# Patient Record
Sex: FEMALE | Race: WHITE | NOT HISPANIC OR LATINO | Employment: FULL TIME | URBAN - METROPOLITAN AREA
[De-identification: names, ages, dates, MRNs, and addresses within clinical notes are randomized per-mention and may not be internally consistent; named-entity substitution may affect disease eponyms.]

---

## 2019-03-21 ENCOUNTER — HOSPITAL ENCOUNTER (OUTPATIENT)
Dept: LABOR AND DELIVERY | Facility: HOSPITAL | Age: 29
Discharge: HOME | End: 2019-03-21
Attending: OBSTETRICS & GYNECOLOGY

## 2019-03-21 LAB
BASOPHILS # BLD AUTO: 0.05 10*3/UL (ref 0–0.2)
BASOPHILS NFR BLD AUTO: 0.3 % (ref 0–3)
CONV ABS IMM GRAN: 0.15 10*3/UL (ref 0–0.2)
CONV IMMATURE GRAN: 0.9 % (ref 0–1.8)
DEPRECATED RDW RBC AUTO: 45.6 FL (ref 36.4–46.3)
EOSINOPHIL # BLD AUTO: 0.03 10*3/UL (ref 0–0.7)
EOSINOPHIL # BLD AUTO: 0.2 % (ref 0–7)
ERYTHROCYTE [DISTWIDTH] IN BLOOD BY AUTOMATED COUNT: 13.7 % (ref 11.7–14.4)
HBA1C MFR BLD: 11.5 G/DL (ref 12–16)
HCT VFR BLD AUTO: 34.8 % (ref 37–47)
LYMPHOCYTES # BLD AUTO: 2.68 10*3/UL (ref 1–5)
MCH RBC QN AUTO: 30.2 PG (ref 27–31)
MCHC RBC AUTO-ENTMCNC: 33 G/DL (ref 33–37)
MCV RBC AUTO: 91.3 FL (ref 81–99)
MONOCYTES # BLD AUTO: 0.74 10*3/UL (ref 0.2–1.2)
MONOCYTES NFR BLD AUTO: 4.4 % (ref 3–10)
NEUTROPHILS # BLD AUTO: 13.24 10*3/UL (ref 2–8)
NEUTROPHILS NFR BLD AUTO: 78.3 % (ref 30–85)
NRBC CBCN: 0 % (ref 0–0.7)
PLATELET # BLD AUTO: 344 10*3/UL (ref 130–400)
PMV BLD AUTO: 9.7 FL (ref 9.4–12.3)
RBC # BLD AUTO: 3.81 10*6/UL (ref 4.2–5.4)
VARIANT LYMPHS NFR BLD MANUAL: 15.9 % (ref 20–45)
WBC # BLD AUTO: 16.89 10*3/UL (ref 4.8–10.8)

## 2019-03-22 LAB
ABO GROUP BLD: NORMAL
BLD GP AB SCN SERPL QL: NORMAL
CONV ABD CONTROL: NORMAL
HGB F MFR AMN: 0 % (ref 0–0.99)
RH BLD: NORMAL

## 2022-02-08 ENCOUNTER — INITIAL PRENATAL (OUTPATIENT)
Dept: OBSTETRICS AND GYNECOLOGY | Facility: CLINIC | Age: 32
End: 2022-02-08

## 2022-02-08 VITALS — DIASTOLIC BLOOD PRESSURE: 80 MMHG | SYSTOLIC BLOOD PRESSURE: 113 MMHG | WEIGHT: 220.8 LBS

## 2022-02-08 DIAGNOSIS — Z34.80 SUPERVISION OF OTHER NORMAL PREGNANCY, ANTEPARTUM: Primary | ICD-10-CM

## 2022-02-08 DIAGNOSIS — O99.210 OBESITY IN PREGNANCY, ANTEPARTUM: ICD-10-CM

## 2022-02-08 DIAGNOSIS — O34.219 PREVIOUS CESAREAN DELIVERY AFFECTING PREGNANCY: ICD-10-CM

## 2022-02-08 DIAGNOSIS — Z67.91 RH NEGATIVE, ANTEPARTUM: ICD-10-CM

## 2022-02-08 DIAGNOSIS — O26.899 RH NEGATIVE, ANTEPARTUM: ICD-10-CM

## 2022-02-08 LAB
ABO GROUP BLD: NORMAL
B-HCG UR QL: POSITIVE
BASOPHILS # BLD AUTO: 0.04 10*3/MM3 (ref 0–0.2)
BASOPHILS NFR BLD AUTO: 0.4 % (ref 0–1.5)
BLD GP AB SCN SERPL QL: NEGATIVE
DEPRECATED RDW RBC AUTO: 46 FL (ref 37–54)
EOSINOPHIL # BLD AUTO: 0.03 10*3/MM3 (ref 0–0.4)
EOSINOPHIL NFR BLD AUTO: 0.3 % (ref 0.3–6.2)
ERYTHROCYTE [DISTWIDTH] IN BLOOD BY AUTOMATED COUNT: 13.9 % (ref 12.3–15.4)
EXPIRATION DATE: ABNORMAL
GLUCOSE UR STRIP-MCNC: NEGATIVE MG/DL
HBV SURFACE AG SERPL QL IA: NORMAL
HCT VFR BLD AUTO: 38.8 % (ref 34–46.6)
HCV AB SER DONR QL: NORMAL
HGB BLD-MCNC: 12.4 G/DL (ref 12–15.9)
HIV1+2 AB SER QL: NORMAL
IMM GRANULOCYTES # BLD AUTO: 0.06 10*3/MM3 (ref 0–0.05)
IMM GRANULOCYTES NFR BLD AUTO: 0.5 % (ref 0–0.5)
INTERNAL NEGATIVE CONTROL: ABNORMAL
INTERNAL POSITIVE CONTROL: ABNORMAL
LYMPHOCYTES # BLD AUTO: 2.06 10*3/MM3 (ref 0.7–3.1)
LYMPHOCYTES NFR BLD AUTO: 18.4 % (ref 19.6–45.3)
Lab: ABNORMAL
MCH RBC QN AUTO: 28.7 PG (ref 26.6–33)
MCHC RBC AUTO-ENTMCNC: 32 G/DL (ref 31.5–35.7)
MCV RBC AUTO: 89.8 FL (ref 79–97)
MONOCYTES # BLD AUTO: 0.51 10*3/MM3 (ref 0.1–0.9)
MONOCYTES NFR BLD AUTO: 4.6 % (ref 5–12)
NEUTROPHILS NFR BLD AUTO: 75.8 % (ref 42.7–76)
NEUTROPHILS NFR BLD AUTO: 8.5 10*3/MM3 (ref 1.7–7)
NRBC BLD AUTO-RTO: 0 /100 WBC (ref 0–0.2)
PLATELET # BLD AUTO: 387 10*3/MM3 (ref 140–450)
PMV BLD AUTO: 10.3 FL (ref 6–12)
PROT UR STRIP-MCNC: NEGATIVE MG/DL
RBC # BLD AUTO: 4.32 10*6/MM3 (ref 3.77–5.28)
RH BLD: NEGATIVE
WBC NRBC COR # BLD: 11.2 10*3/MM3 (ref 3.4–10.8)

## 2022-02-08 PROCEDURE — 81025 URINE PREGNANCY TEST: CPT | Performed by: OBSTETRICS & GYNECOLOGY

## 2022-02-08 PROCEDURE — 83020 HEMOGLOBIN ELECTROPHORESIS: CPT | Performed by: OBSTETRICS & GYNECOLOGY

## 2022-02-08 PROCEDURE — 87186 SC STD MICRODIL/AGAR DIL: CPT | Performed by: OBSTETRICS & GYNECOLOGY

## 2022-02-08 PROCEDURE — 86803 HEPATITIS C AB TEST: CPT | Performed by: OBSTETRICS & GYNECOLOGY

## 2022-02-08 PROCEDURE — 87661 TRICHOMONAS VAGINALIS AMPLIF: CPT | Performed by: OBSTETRICS & GYNECOLOGY

## 2022-02-08 PROCEDURE — 87591 N.GONORRHOEAE DNA AMP PROB: CPT | Performed by: OBSTETRICS & GYNECOLOGY

## 2022-02-08 PROCEDURE — 87086 URINE CULTURE/COLONY COUNT: CPT | Performed by: OBSTETRICS & GYNECOLOGY

## 2022-02-08 PROCEDURE — G0123 SCREEN CERV/VAG THIN LAYER: HCPCS | Performed by: OBSTETRICS & GYNECOLOGY

## 2022-02-08 PROCEDURE — 87077 CULTURE AEROBIC IDENTIFY: CPT | Performed by: OBSTETRICS & GYNECOLOGY

## 2022-02-08 PROCEDURE — 87491 CHLMYD TRACH DNA AMP PROBE: CPT | Performed by: OBSTETRICS & GYNECOLOGY

## 2022-02-08 PROCEDURE — 99214 OFFICE O/P EST MOD 30 MIN: CPT | Performed by: OBSTETRICS & GYNECOLOGY

## 2022-02-08 PROCEDURE — G0432 EIA HIV-1/HIV-2 SCREEN: HCPCS | Performed by: OBSTETRICS & GYNECOLOGY

## 2022-02-08 RX ORDER — PRENATAL VIT NO.126/IRON/FOLIC 28MG-0.8MG
1 TABLET ORAL DAILY
COMMUNITY
End: 2022-08-16

## 2022-02-09 ENCOUNTER — TELEPHONE (OUTPATIENT)
Dept: OBSTETRICS AND GYNECOLOGY | Facility: CLINIC | Age: 32
End: 2022-02-09

## 2022-02-09 DIAGNOSIS — O23.40 URINARY TRACT INFECTION IN MOTHER DURING PREGNANCY, ANTEPARTUM: Primary | ICD-10-CM

## 2022-02-09 LAB — RPR SER QL: NORMAL

## 2022-02-09 RX ORDER — NITROFURANTOIN 25; 75 MG/1; MG/1
100 CAPSULE ORAL 2 TIMES DAILY
Qty: 14 CAPSULE | Refills: 0 | Status: SHIPPED | OUTPATIENT
Start: 2022-02-09 | End: 2022-02-16

## 2022-02-09 NOTE — TELEPHONE ENCOUNTER
----- Message from Mahamed Chowdhury MD sent at 2/9/2022  3:58 PM EST -----  Notify patient of UTI.  Antibiotic sent to the pharmacy.  Commend she pick these up ASAP and take her medication 1 tablet twice daily until all medicine has been taken

## 2022-02-09 NOTE — TELEPHONE ENCOUNTER
Discussed results with pt. Adv rx is at the pharmacy and she should start it asap and complete it as directed

## 2022-02-10 ENCOUNTER — PATIENT ROUNDING (BHMG ONLY) (OUTPATIENT)
Dept: OBSTETRICS AND GYNECOLOGY | Facility: CLINIC | Age: 32
End: 2022-02-10

## 2022-02-10 PROBLEM — O99.210 OBESITY IN PREGNANCY, ANTEPARTUM: Status: ACTIVE | Noted: 2022-02-10

## 2022-02-10 PROBLEM — O26.899 RH NEGATIVE, ANTEPARTUM: Status: ACTIVE | Noted: 2022-02-10

## 2022-02-10 PROBLEM — Z67.91 RH NEGATIVE, ANTEPARTUM: Status: ACTIVE | Noted: 2022-02-10

## 2022-02-10 LAB
BACTERIA SPEC AEROBE CULT: ABNORMAL
HGB A MFR BLD ELPH: 97 % (ref 96.4–98.8)
HGB A2 MFR BLD ELPH: 3 % (ref 1.8–3.2)
HGB F MFR BLD ELPH: 0 % (ref 0–2)
HGB FRACT BLD-IMP: NORMAL
HGB S MFR BLD ELPH: 0 %
RUBV IGG SERPL IA-ACNC: 4.27 INDEX

## 2022-02-10 NOTE — ASSESSMENT & PLAN NOTE
Ultrasounds from Dr. Garcia's office reviewed with patient.  EDC finalized.  Check prenatal labs  Check anatomy ultrasound in approximately 7 weeks  Patient declines prenatal genetic screening  Discussed medications safe in pregnancy  Reviewed call rotation and office visit schedule  Recommended COVID-19 and flu vaccinations.  Discussed that pregnancy is a risk factor for severe disease.  Continue prenatal vitamins

## 2022-02-10 NOTE — PROGRESS NOTES
OB Initial Visit    CC- Here for care of current pregnancy     Subjective:  31 y.o.  presenting for her first obstetrical visit at our practice.  She is transferring care from Dr. Garcia.  LMP: Patient's last menstrual period was 10/31/2021.   The patient has no complaints today.  She reports that she did not have any laboratories or have smear completed at Dr. Vega office.  She did have 2 ultrasounds and some beta-hCG levels drawn.  She reports that she was having some early spotting, which has since resolved.  She reports that her most recent ultrasound was reassuring.  She has no concerns today.      Reviewed and updated:  OBHx, GYNHx (STDs), PMHx, Medications, Allergies, PSHx, Social Hx, Preventative Hx (PAP), Hx of abuse/safe environment, Vaccine Hx including hx of chickenpox or vaccine, Genetic Hx (pt, FOB, both families).        Objective:  /80   Wt 100 kg (220 lb 12.8 oz)   LMP 10/31/2021   General- NAD, alert and oriented, appropriate  Psych- Normal mood, good memory  Neck- No masses, no thyroid enlargement  CV- Regular rhythm, no murnurs  Resp- CTA to bases, no wheezes  Abdomen- Soft, non distended, non tender, no masses     External genitalia- Normal, no lesions  Urethra- Normal, no masses, non tender  Vagina- Normal, no discharge  Bladder- Normal, no masses, non tender  Cvx- Normal, no lesions, no discharge, no CMT  Uterus- Normal shape and consistency, non tender, Consistent with dates  Adnexa- Normal, no mass, non tender    Lymphatic- No palpable neck, axillary, or groin nodes  Ext- No edema, no cyanosis    Skin- No lesions, no rashes, no acanthosis nigricans      Assessment and Plan:  Diagnoses and all orders for this visit:    1. Supervision of other normal pregnancy, antepartum (Primary)  Overview:  EDC: 2022    Prenatal genetic screening: Declined    Previous  delivery  Rh-  Obesity    COVID-19 vaccination: Recommended  Tdap vaccination:  Flu vaccination:  Recommended    Assessment & Plan:  Ultrasounds from Dr. Garcia's office reviewed with patient.  EDC finalized.  Check prenatal labs  Check anatomy ultrasound in approximately 7 weeks  Patient declines prenatal genetic screening  Discussed medications safe in pregnancy  Reviewed call rotation and office visit schedule  Recommended COVID-19 and flu vaccinations.  Discussed that pregnancy is a risk factor for severe disease.  Continue prenatal vitamins    Orders:  -     POC Urinalysis Dipstick  -     POC Pregnancy, Urine  -     Urine Culture - Urine, Urine, Clean Catch  -     Hemoglobinopathy Fractionation [place]; Future  -     OB Panel With HIV; Future  -     IGP,CtNgTv,rfx Aptima HPV ASCU; Future  -     US Ob Detail Fetal Anatomy Single or First Gestation; Future  -     Hemoglobinopathy Fractionation [place]  -     OB Panel With HIV  -     IGP,CtNgTv,rfx Aptima HPV ASCU    2. Previous  delivery affecting pregnancy  Overview:  Repeat  delivery      3. Obesity in pregnancy, antepartum  Assessment & Plan:  Discussed exercise, nutrition and appropriate weight gain in pregnancy      4. Rh negative, antepartum  Overview:  RhoGam at 28 weeks      14w2d    Genetic Screening: Counseled.  Declines all.     Vaccines: Recommend FLU vaccine this season, R/B discussed  Recommend COVID vaccine, R/B discussed    Counseling: Nutrition discussed, calories, activity/exercise in pregnancy  Discussed dietary restrictions/safety food preparation in pregnancy  Reviewed what to expect prenatal visits, office providers  Appropriate trimester precautions provided, N/V, vag bleeding, cramping  Questions answered    Return in about 4 weeks (around 3/8/2022) for Recheck.      Mahamed Chowdhury MD  2022

## 2022-02-10 NOTE — PROGRESS NOTES
A My-Chart message has been sent to the patient for PATIENT ROUNDING with [facility].

## 2022-02-11 ENCOUNTER — TELEPHONE (OUTPATIENT)
Dept: OBSTETRICS AND GYNECOLOGY | Facility: CLINIC | Age: 32
End: 2022-02-11

## 2022-02-11 LAB
C TRACH RRNA CVX QL NAA+PROBE: NEGATIVE
CONV .: NORMAL
CYTOLOGIST CVX/VAG CYTO: NORMAL
CYTOLOGY CVX/VAG DOC CYTO: NORMAL
CYTOLOGY CVX/VAG DOC THIN PREP: NORMAL
DX ICD CODE: NORMAL
HIV 1 & 2 AB SER-IMP: NORMAL
N GONORRHOEA RRNA CVX QL NAA+PROBE: NEGATIVE
OTHER STN SPEC: NORMAL
STAT OF ADQ CVX/VAG CYTO-IMP: NORMAL
T VAGINALIS RRNA SPEC QL NAA+PROBE: NEGATIVE

## 2022-02-11 NOTE — TELEPHONE ENCOUNTER
Patient received the generic equivalent of Macrobid. Patient advised it is okay to take and to finish it as prescribed.

## 2022-02-11 NOTE — TELEPHONE ENCOUNTER
Patient called with questions about a script she received. I tried to return her call. She did not answer and her voicemail was full.

## 2022-02-14 ENCOUNTER — TELEPHONE (OUTPATIENT)
Dept: LABOR AND DELIVERY | Facility: HOSPITAL | Age: 32
End: 2022-02-14

## 2022-03-09 ENCOUNTER — ROUTINE PRENATAL (OUTPATIENT)
Dept: OBSTETRICS AND GYNECOLOGY | Facility: CLINIC | Age: 32
End: 2022-03-09

## 2022-03-09 VITALS
HEIGHT: 61 IN | WEIGHT: 222.8 LBS | DIASTOLIC BLOOD PRESSURE: 78 MMHG | BODY MASS INDEX: 42.06 KG/M2 | SYSTOLIC BLOOD PRESSURE: 125 MMHG

## 2022-03-09 DIAGNOSIS — Z67.91 RH NEGATIVE, ANTEPARTUM: ICD-10-CM

## 2022-03-09 DIAGNOSIS — O34.219 PREVIOUS CESAREAN DELIVERY AFFECTING PREGNANCY: ICD-10-CM

## 2022-03-09 DIAGNOSIS — O99.210 OBESITY IN PREGNANCY, ANTEPARTUM: ICD-10-CM

## 2022-03-09 DIAGNOSIS — O26.899 RH NEGATIVE, ANTEPARTUM: ICD-10-CM

## 2022-03-09 DIAGNOSIS — Z34.80 SUPERVISION OF OTHER NORMAL PREGNANCY, ANTEPARTUM: Primary | ICD-10-CM

## 2022-03-09 LAB
GLUCOSE UR STRIP-MCNC: NEGATIVE MG/DL
PROT UR STRIP-MCNC: NEGATIVE MG/DL

## 2022-03-09 PROCEDURE — 99213 OFFICE O/P EST LOW 20 MIN: CPT | Performed by: OBSTETRICS & GYNECOLOGY

## 2022-03-09 RX ORDER — AZITHROMYCIN 250 MG/1
TABLET, FILM COATED ORAL
COMMUNITY
Start: 2022-03-07 | End: 2022-05-25

## 2022-03-09 NOTE — PROGRESS NOTES
"OB FOLLOW UP    CC: Scheduled OB routine FU     Prenatal care complicated by:   Patient Active Problem List   Diagnosis   • Supervision of other normal pregnancy, antepartum   • Previous  delivery affecting pregnancy   • Obesity in pregnancy, antepartum   • Rh negative, antepartum       Subjective:   Patient has: No complaints, No leaking fluid, No vaginal bleeding, No contractions, Adequate FM    Objective:  Urine glucose/protein- see flow sheet      /78   Ht 155 cm (61.02\")   Wt 101 kg (222 lb 12.8 oz)   LMP 10/31/2021   BMI 42.07 kg/m²   See OB flow for LE edema, and cvx exam if applicable  FHT: 148 BPM   Uterine Size: size equals dates      Assessment and Plan:  Diagnoses and all orders for this visit:    1. Supervision of other normal pregnancy, antepartum (Primary)  Overview:  EDC: 2022    Prenatal genetic screening: Declined    Previous  delivery  Rh-  Obesity    COVID-19 vaccination: Recommended  Tdap vaccination:  Flu vaccination: Recommended    Assessment & Plan:  Anatomy ultrasound next office visit  Continue prenatal vitamins  Anticipatory guidance    Orders:  -     POC Urinalysis Dipstick    2. Rh negative, antepartum  Overview:  RhoGam at 28 weeks      3. Previous  delivery affecting pregnancy  Overview:  Repeat  delivery      4. Obesity in pregnancy, antepartum  Assessment & Plan:  Discussed exercise, nutrition and appropriate weight gain      18w3d  Reassuring pregnancy progress    Counseling: Second trimester precautions  OB precautions, leaking, VB, césar barrera vs PTL/Labor    Questions answered    Return in about 4 weeks (around 2022) for Recheck.      Mahamed Chowdhury MD  2022  "

## 2022-03-15 ENCOUNTER — REFERRAL TRIAGE (OUTPATIENT)
Dept: LABOR AND DELIVERY | Facility: HOSPITAL | Age: 32
End: 2022-03-15

## 2022-03-15 ENCOUNTER — PATIENT OUTREACH (OUTPATIENT)
Dept: LABOR AND DELIVERY | Facility: HOSPITAL | Age: 32
End: 2022-03-15

## 2022-03-15 NOTE — OUTREACH NOTE
Enrollment    Questions/Answers    Flowsheet Row Responses   Would like to participate? Yes   Date of Intake Visit 03/29/22          [facility]      Irene Myles RN  Maternity Nurse Navigator    3/15/2022, 14:19 EDT

## 2022-03-29 ENCOUNTER — ROUTINE PRENATAL (OUTPATIENT)
Dept: OBSTETRICS AND GYNECOLOGY | Facility: CLINIC | Age: 32
End: 2022-03-29

## 2022-03-29 ENCOUNTER — PATIENT OUTREACH (OUTPATIENT)
Dept: LABOR AND DELIVERY | Facility: HOSPITAL | Age: 32
End: 2022-03-29

## 2022-03-29 VITALS — SYSTOLIC BLOOD PRESSURE: 123 MMHG | WEIGHT: 227 LBS | BODY MASS INDEX: 42.86 KG/M2 | DIASTOLIC BLOOD PRESSURE: 82 MMHG

## 2022-03-29 DIAGNOSIS — O99.210 OBESITY IN PREGNANCY, ANTEPARTUM: ICD-10-CM

## 2022-03-29 DIAGNOSIS — O26.899 RH NEGATIVE, ANTEPARTUM: ICD-10-CM

## 2022-03-29 DIAGNOSIS — O34.219 PREVIOUS CESAREAN DELIVERY AFFECTING PREGNANCY: ICD-10-CM

## 2022-03-29 DIAGNOSIS — Z67.91 RH NEGATIVE, ANTEPARTUM: ICD-10-CM

## 2022-03-29 DIAGNOSIS — Z34.80 SUPERVISION OF OTHER NORMAL PREGNANCY, ANTEPARTUM: Primary | ICD-10-CM

## 2022-03-29 LAB
GLUCOSE UR STRIP-MCNC: NEGATIVE MG/DL
PROT UR STRIP-MCNC: NEGATIVE MG/DL

## 2022-03-29 PROCEDURE — 99213 OFFICE O/P EST LOW 20 MIN: CPT | Performed by: OBSTETRICS & GYNECOLOGY

## 2022-03-29 NOTE — OUTREACH NOTE
Intake    Questions/Answers    Flowsheet Row Responses   Best Method for Contacting Cell   Do you have a dentist? Yes   Have you seen a dentist in the last 6 months No   Resources Presently Utilizing: None   Maternal Warning Signs Provided   Other: Provided      [facility]      Irene Myles RN  Maternity Nurse Navigator    3/29/2022, 15:32 EDT

## 2022-04-26 ENCOUNTER — PREP FOR SURGERY (OUTPATIENT)
Dept: OTHER | Facility: HOSPITAL | Age: 32
End: 2022-04-26

## 2022-04-26 ENCOUNTER — ROUTINE PRENATAL (OUTPATIENT)
Dept: OBSTETRICS AND GYNECOLOGY | Facility: CLINIC | Age: 32
End: 2022-04-26

## 2022-04-26 VITALS — BODY MASS INDEX: 43.24 KG/M2 | WEIGHT: 229 LBS | SYSTOLIC BLOOD PRESSURE: 136 MMHG | DIASTOLIC BLOOD PRESSURE: 83 MMHG

## 2022-04-26 DIAGNOSIS — O26.899 RH NEGATIVE, ANTEPARTUM: ICD-10-CM

## 2022-04-26 DIAGNOSIS — Z67.91 RH NEGATIVE, ANTEPARTUM: ICD-10-CM

## 2022-04-26 DIAGNOSIS — O34.211 MATERNAL CARE DUE TO LOW TRANSVERSE UTERINE SCAR FROM PREVIOUS CESAREAN DELIVERY: Primary | ICD-10-CM

## 2022-04-26 DIAGNOSIS — O34.219 PREVIOUS CESAREAN DELIVERY AFFECTING PREGNANCY: ICD-10-CM

## 2022-04-26 DIAGNOSIS — O99.210 OBESITY IN PREGNANCY, ANTEPARTUM: ICD-10-CM

## 2022-04-26 DIAGNOSIS — Z34.80 SUPERVISION OF OTHER NORMAL PREGNANCY, ANTEPARTUM: Primary | ICD-10-CM

## 2022-04-26 LAB
DEPRECATED RDW RBC AUTO: 44.1 FL (ref 37–54)
ERYTHROCYTE [DISTWIDTH] IN BLOOD BY AUTOMATED COUNT: 13.5 % (ref 12.3–15.4)
GLUCOSE 1H P GLC SERPL-MCNC: 119 MG/DL (ref 65–139)
GLUCOSE UR STRIP-MCNC: NEGATIVE MG/DL
HCT VFR BLD AUTO: 34.9 % (ref 34–46.6)
HGB BLD-MCNC: 11.7 G/DL (ref 12–15.9)
MCH RBC QN AUTO: 30.1 PG (ref 26.6–33)
MCHC RBC AUTO-ENTMCNC: 33.5 G/DL (ref 31.5–35.7)
MCV RBC AUTO: 89.7 FL (ref 79–97)
PLATELET # BLD AUTO: 343 10*3/MM3 (ref 140–450)
PMV BLD AUTO: 10.4 FL (ref 6–12)
PROT UR STRIP-MCNC: NEGATIVE MG/DL
RBC # BLD AUTO: 3.89 10*6/MM3 (ref 3.77–5.28)
WBC NRBC COR # BLD: 12.38 10*3/MM3 (ref 3.4–10.8)

## 2022-04-26 PROCEDURE — 87086 URINE CULTURE/COLONY COUNT: CPT | Performed by: OBSTETRICS & GYNECOLOGY

## 2022-04-26 PROCEDURE — 82950 GLUCOSE TEST: CPT | Performed by: OBSTETRICS & GYNECOLOGY

## 2022-04-26 PROCEDURE — 99214 OFFICE O/P EST MOD 30 MIN: CPT | Performed by: OBSTETRICS & GYNECOLOGY

## 2022-04-26 PROCEDURE — 85027 COMPLETE CBC AUTOMATED: CPT | Performed by: OBSTETRICS & GYNECOLOGY

## 2022-04-26 NOTE — PROGRESS NOTES
OB FOLLOW UP    CC: Scheduled OB routine FU     Prenatal care complicated by:   Patient Active Problem List   Diagnosis   • Supervision of other normal pregnancy, antepartum   • Previous  delivery affecting pregnancy   • Obesity in pregnancy, antepartum   • Rh negative, antepartum       Subjective:   Patient has: No complaints, No leaking fluid, No vaginal bleeding, No contractions, Adequate FM    Objective:  Urine glucose/protein- see flow sheet      /83   Wt 104 kg (229 lb)   LMP 10/31/2021   BMI 43.24 kg/m²   See OB flow for LE edema, and cvx exam if applicable  FHT: 142 BPM   Uterine Size: 25 cm      Assessment and Plan:  Diagnoses and all orders for this visit:    1. Supervision of other normal pregnancy, antepartum (Primary)  Overview:  EDC: 2022    Prenatal genetic screening: Declined    Previous  delivery  Rh-  Obesity    COVID-19 vaccination: Recommended  Tdap vaccination: Rx 2022  Flu vaccination: Recommended    Anatomy ultrasound 3/29/2022: Normal anatomy.  Posterior/fundal placenta.    Assessment & Plan:  1 hour GTT today  Tdap Rx  Continue prenatal vitamins  Fetal kick counts   labor warnings    Orders:  -     POC Urinalysis Dipstick  -     Gestational Diabetes Screen 1 Hour  -     CBC (No Diff)  -     Urine Culture - Urine, Urine, Clean Catch    2. Rh negative, antepartum  Overview:  RhoGam at 28 weeks    Assessment & Plan:  RhoGAM scheduled    Orders:  -     Ambulatory Referral to [facility] For Infusion Treatment  -      RhIg Evaluation; Future  -     Doses of rh immune globulin; Future    3. Obesity in pregnancy, antepartum  Assessment & Plan:  Discussed exercise, nutrition and appropriate weight gain in pregnancy      4. Previous  delivery affecting pregnancy  Overview:  Repeat  delivery          25w2d  Reassuring pregnancy progress    Counseling: Second trimester precautions  OB precautions, leaking, VB, césar barrera vs  PTL/Labor    Questions answered    Return in about 4 weeks (around 5/24/2022) for Recheck.      Mahamed Chowdhury MD  04/26/2022

## 2022-04-27 LAB — BACTERIA SPEC AEROBE CULT: NO GROWTH

## 2022-04-28 ENCOUNTER — PATIENT OUTREACH (OUTPATIENT)
Dept: LABOR AND DELIVERY | Facility: HOSPITAL | Age: 32
End: 2022-04-28

## 2022-04-28 NOTE — OUTREACH NOTE
[facility]  Check-In    Questions/Answers    Flowsheet Row Responses   Demographics Reviewed Yes   Currently Employed Yes   Able to keep appointments as scheduled Yes   Baby Active/Feeling Fetal Movemen Yes   Questions regarding prenatal visits or tests to be ordered? No   Resource/Environmental Concerns None   Do you have any questions related to your care experience, your pregnancy, plans for delivery, any concerns, etc? No          Doing glucola today. Vomiting after blood draw but feeling ok afterwards.  driving home.     Irene Myles RN  Maternity Nurse Navigator    4/28/2022, 09:04 EDT

## 2022-05-16 ENCOUNTER — APPOINTMENT (OUTPATIENT)
Dept: INFUSION THERAPY | Facility: HOSPITAL | Age: 32
End: 2022-05-16

## 2022-05-25 ENCOUNTER — LAB (OUTPATIENT)
Dept: LAB | Facility: HOSPITAL | Age: 32
End: 2022-05-25

## 2022-05-25 ENCOUNTER — ROUTINE PRENATAL (OUTPATIENT)
Dept: OBSTETRICS AND GYNECOLOGY | Facility: CLINIC | Age: 32
End: 2022-05-25

## 2022-05-25 ENCOUNTER — TELEPHONE (OUTPATIENT)
Dept: OBSTETRICS AND GYNECOLOGY | Facility: CLINIC | Age: 32
End: 2022-05-25

## 2022-05-25 ENCOUNTER — HOSPITAL ENCOUNTER (OUTPATIENT)
Dept: INFUSION THERAPY | Facility: HOSPITAL | Age: 32
Setting detail: INFUSION SERIES
Discharge: HOME | End: 2022-05-25

## 2022-05-25 VITALS — BODY MASS INDEX: 45.9 KG/M2 | SYSTOLIC BLOOD PRESSURE: 121 MMHG | WEIGHT: 235 LBS | DIASTOLIC BLOOD PRESSURE: 85 MMHG

## 2022-05-25 VITALS
HEART RATE: 119 BPM | HEIGHT: 60 IN | WEIGHT: 236.55 LBS | RESPIRATION RATE: 20 BRPM | SYSTOLIC BLOOD PRESSURE: 129 MMHG | TEMPERATURE: 98.2 F | BODY MASS INDEX: 46.44 KG/M2 | OXYGEN SATURATION: 99 % | DIASTOLIC BLOOD PRESSURE: 82 MMHG

## 2022-05-25 DIAGNOSIS — Z67.91 RH NEGATIVE, ANTEPARTUM: Primary | ICD-10-CM

## 2022-05-25 DIAGNOSIS — O26.899 RH NEGATIVE, ANTEPARTUM: ICD-10-CM

## 2022-05-25 DIAGNOSIS — O26.899 RH NEGATIVE, ANTEPARTUM: Primary | ICD-10-CM

## 2022-05-25 DIAGNOSIS — O34.219 PREVIOUS CESAREAN DELIVERY AFFECTING PREGNANCY: ICD-10-CM

## 2022-05-25 DIAGNOSIS — O99.210 OBESITY IN PREGNANCY, ANTEPARTUM: ICD-10-CM

## 2022-05-25 DIAGNOSIS — Z67.91 RH NEGATIVE, ANTEPARTUM: ICD-10-CM

## 2022-05-25 DIAGNOSIS — Z34.80 SUPERVISION OF OTHER NORMAL PREGNANCY, ANTEPARTUM: Primary | ICD-10-CM

## 2022-05-25 LAB
ABO GROUP BLD: NORMAL
BLD GP AB SCN SERPL QL: NEGATIVE
GLUCOSE UR STRIP-MCNC: ABNORMAL MG/DL
NUMBER OF DOSES: NORMAL
PROT UR STRIP-MCNC: ABNORMAL MG/DL
RH BLD: NEGATIVE

## 2022-05-25 PROCEDURE — 25010000002 RHO D IMMUNE GLOBULIN 1500 UNIT/2ML SOLUTION PREFILLED SYRINGE: Performed by: OBSTETRICS & GYNECOLOGY

## 2022-05-25 PROCEDURE — 96372 THER/PROPH/DIAG INJ SC/IM: CPT

## 2022-05-25 PROCEDURE — 36415 COLL VENOUS BLD VENIPUNCTURE: CPT | Performed by: OBSTETRICS & GYNECOLOGY

## 2022-05-25 PROCEDURE — 86850 RBC ANTIBODY SCREEN: CPT | Performed by: OBSTETRICS & GYNECOLOGY

## 2022-05-25 PROCEDURE — 99213 OFFICE O/P EST LOW 20 MIN: CPT | Performed by: OBSTETRICS & GYNECOLOGY

## 2022-05-25 PROCEDURE — 86900 BLOOD TYPING SEROLOGIC ABO: CPT | Performed by: OBSTETRICS & GYNECOLOGY

## 2022-05-25 PROCEDURE — 86901 BLOOD TYPING SEROLOGIC RH(D): CPT | Performed by: OBSTETRICS & GYNECOLOGY

## 2022-05-25 RX ADMIN — HUMAN RHO(D) IMMUNE GLOBULIN 1500 UNITS: 1500 SOLUTION INTRAMUSCULAR; INTRAVENOUS at 15:10

## 2022-05-25 NOTE — PROGRESS NOTES
OB FOLLOW UP    CC: Scheduled OB routine FU     Prenatal care complicated by:   Patient Active Problem List   Diagnosis   • Supervision of other normal pregnancy, antepartum   • Previous  delivery affecting pregnancy   • Obesity in pregnancy, antepartum   • Rh negative, antepartum       Subjective:   Patient has: No complaints, No leaking fluid, No vaginal bleeding, No contractions, Adequate FM    Objective:  Urine glucose/protein- see flow sheet      /85   Wt 107 kg (235 lb)   LMP 10/31/2021   BMI 45.90 kg/m²   See OB flow for LE edema, and cvx exam if applicable  FHT: 154 BPM   Uterine Size: 30 cm      Assessment and Plan:  Diagnoses and all orders for this visit:    1. Supervision of other normal pregnancy, antepartum (Primary)  Overview:  EDC: 2022    Prenatal genetic screening: Declined    Previous  delivery  Rh-  Obesity    COVID-19 vaccination: Recommended  Tdap vaccination: Rx 2022  Flu vaccination: Recommended    Anatomy ultrasound 3/29/2022: Normal anatomy.  Posterior/fundal placenta.    Assessment & Plan:  Continue prenatal vitamins  Fetal kick counts   labor warnings    Orders:  -     POC Urinalysis Dipstick    2. Previous  delivery affecting pregnancy  Overview:  Repeat  delivery: JG 22      3. Obesity in pregnancy, antepartum  Assessment & Plan:  Discussed exercise, nutrition and appropriate weight gain      4. Rh negative, antepartum  Overview:  RhoGam at 28 weeks: Done 2022          29w3d  Reassuring pregnancy progress    Counseling: OB precautions, leaking, VB, césar barrera vs PTL/Labor  FKC    Questions answered    Return in about 2 weeks (around 2022) for Recheck.      Mahamed Chowdhury MD  2022

## 2022-06-07 ENCOUNTER — ROUTINE PRENATAL (OUTPATIENT)
Dept: OBSTETRICS AND GYNECOLOGY | Facility: CLINIC | Age: 32
End: 2022-06-07

## 2022-06-07 ENCOUNTER — PATIENT OUTREACH (OUTPATIENT)
Dept: LABOR AND DELIVERY | Facility: HOSPITAL | Age: 32
End: 2022-06-07

## 2022-06-07 VITALS — WEIGHT: 237.4 LBS | SYSTOLIC BLOOD PRESSURE: 124 MMHG | BODY MASS INDEX: 46.36 KG/M2 | DIASTOLIC BLOOD PRESSURE: 80 MMHG

## 2022-06-07 DIAGNOSIS — Z67.91 RH NEGATIVE, ANTEPARTUM: ICD-10-CM

## 2022-06-07 DIAGNOSIS — Z34.80 SUPERVISION OF OTHER NORMAL PREGNANCY, ANTEPARTUM: Primary | ICD-10-CM

## 2022-06-07 DIAGNOSIS — O34.219 PREVIOUS CESAREAN DELIVERY AFFECTING PREGNANCY: ICD-10-CM

## 2022-06-07 DIAGNOSIS — O26.899 RH NEGATIVE, ANTEPARTUM: ICD-10-CM

## 2022-06-07 DIAGNOSIS — O99.210 OBESITY IN PREGNANCY, ANTEPARTUM: ICD-10-CM

## 2022-06-07 PROCEDURE — 99213 OFFICE O/P EST LOW 20 MIN: CPT | Performed by: OBSTETRICS & GYNECOLOGY

## 2022-06-07 NOTE — PROGRESS NOTES
OB FOLLOW UP    CC: Scheduled OB routine FU     Prenatal care complicated by:   Patient Active Problem List   Diagnosis   • Supervision of other normal pregnancy, antepartum   • Previous  delivery affecting pregnancy   • Obesity in pregnancy, antepartum   • Rh negative, antepartum       Subjective:   Patient has: No complaints, No leaking fluid, No vaginal bleeding, No contractions, Adequate FM    Objective:  Urine glucose/protein- see flow sheet      /80   Wt 108 kg (237 lb 6.4 oz)   LMP 10/31/2021   BMI 46.36 kg/m²   See OB flow for LE edema, and cvx exam if applicable  FHT: 143 BPM   Uterine Size: 32 cm      Assessment and Plan:  Diagnoses and all orders for this visit:    1. Supervision of other normal pregnancy, antepartum (Primary)  Overview:  EDC: 2022    Prenatal genetic screening: Declined    Previous  delivery  Rh-  Obesity    COVID-19 vaccination: Recommended  Tdap vaccination: Rx 2022  Flu vaccination: Recommended    Anatomy ultrasound 3/29/2022: Normal anatomy.  Posterior/fundal placenta.    Assessment & Plan:  Continue prenatal vitamins  Fetal kick counts   labor warnings    Orders:  -     POC Urinalysis Dipstick    2. Obesity in pregnancy, antepartum  Assessment & Plan:  Discussed exercise, nutrition and appropriate weight gain in pregnancy      3. Previous  delivery affecting pregnancy  Overview:  Repeat  delivery: JG 22      4. Rh negative, antepartum  Overview:  RhoGam at 28 weeks: Done 2022          31w2d  Reassuring pregnancy progress    Counseling: OB precautions, leaking, VB, césar barrera vs PTL/Labor  FKC    Questions answered    Return in about 2 weeks (around 2022) for Recheck.      Mahamed Chowdhury MD  2022

## 2022-06-07 NOTE — OUTREACH NOTE
[facility]  Check-In    Current Estimated Gestational Age: 31w2d    Questions/Answers    Flowsheet Row Responses   Demographics Reviewed Yes   Currently Employed Yes   Able to keep appointments as scheduled Yes   Gender(s) and Name(s) Girl   Baby Active/Feeling Fetal Movemen Yes   How are you presently feeling? Great   Questions regarding prenatal visits or tests to be ordered? No   Resource/Environmental Concerns None   Do you have any questions related to your care experience, your pregnancy, plans for delivery, any concerns, etc? No          Doing well. No questions.     Irene Myles RN  Maternity Nurse Navigator    6/7/2022, 15:47 EDT

## 2022-06-17 ENCOUNTER — TELEPHONE (OUTPATIENT)
Dept: OBSTETRICS AND GYNECOLOGY | Facility: CLINIC | Age: 32
End: 2022-06-17

## 2022-06-20 ENCOUNTER — ROUTINE PRENATAL (OUTPATIENT)
Dept: OBSTETRICS AND GYNECOLOGY | Facility: CLINIC | Age: 32
End: 2022-06-20

## 2022-06-20 VITALS — DIASTOLIC BLOOD PRESSURE: 83 MMHG | BODY MASS INDEX: 47.07 KG/M2 | SYSTOLIC BLOOD PRESSURE: 132 MMHG | WEIGHT: 241 LBS

## 2022-06-20 DIAGNOSIS — O99.210 OBESITY IN PREGNANCY, ANTEPARTUM: ICD-10-CM

## 2022-06-20 DIAGNOSIS — Z67.91 RH NEGATIVE, ANTEPARTUM: ICD-10-CM

## 2022-06-20 DIAGNOSIS — O26.899 RH NEGATIVE, ANTEPARTUM: ICD-10-CM

## 2022-06-20 DIAGNOSIS — Z34.80 SUPERVISION OF OTHER NORMAL PREGNANCY, ANTEPARTUM: Primary | ICD-10-CM

## 2022-06-20 DIAGNOSIS — O34.219 PREVIOUS CESAREAN DELIVERY AFFECTING PREGNANCY: ICD-10-CM

## 2022-06-20 LAB
GLUCOSE UR STRIP-MCNC: NEGATIVE MG/DL
PROT UR STRIP-MCNC: NEGATIVE MG/DL

## 2022-06-20 PROCEDURE — 99213 OFFICE O/P EST LOW 20 MIN: CPT | Performed by: OBSTETRICS & GYNECOLOGY

## 2022-06-20 NOTE — PROGRESS NOTES
OB FOLLOW UP    CC: Scheduled OB routine FU     Prenatal care complicated by:   Patient Active Problem List   Diagnosis   • Supervision of other normal pregnancy, antepartum   • Previous  delivery affecting pregnancy   • Obesity in pregnancy, antepartum   • Rh negative, antepartum       Subjective:   Patient has: No complaints, No leaking fluid, No vaginal bleeding, No contractions, Adequate FM    Objective:  Urine glucose/protein- see flow sheet      /83   Wt 109 kg (241 lb)   LMP 10/31/2021   BMI 47.07 kg/m²   See OB flow for LE edema, and cvx exam if applicable  FHT: 150 BPM   Uterine Size: 34 cm      Assessment and Plan:  Diagnoses and all orders for this visit:    1. Supervision of other normal pregnancy, antepartum (Primary)  Overview:  EDC: 2022    Prenatal genetic screening: Declined    Previous  delivery  Rh-  Obesity    COVID-19 vaccination: Recommended  Tdap vaccination: Rx 2022  Flu vaccination: Recommended    Anatomy ultrasound 3/29/2022: Normal anatomy.  Posterior/fundal placenta.    Assessment & Plan:  Continue prenatal vitamins  Fetal kick counts   labor warnings    Orders:  -     POC Urinalysis Dipstick    2. Rh negative, antepartum  Overview:  RhoGam at 28 weeks: Done 2022      3. Previous  delivery affecting pregnancy  Overview:  Repeat  delivery: JG 22      4. Obesity in pregnancy, antepartum  Assessment & Plan:  Discussed exercise, nutrition and appropriate weight gain in pregnancy          33w1d  Reassuring pregnancy progress    Counseling: OB precautions, leaking, VB, césar barrera vs PTL/Labor  FKC    Questions answered    Return in about 2 weeks (around 2022) for Recheck.      Mahamed Chowdhury MD  2022

## 2022-07-05 ENCOUNTER — ROUTINE PRENATAL (OUTPATIENT)
Dept: OBSTETRICS AND GYNECOLOGY | Facility: CLINIC | Age: 32
End: 2022-07-05

## 2022-07-05 VITALS — SYSTOLIC BLOOD PRESSURE: 121 MMHG | DIASTOLIC BLOOD PRESSURE: 79 MMHG | WEIGHT: 245 LBS | BODY MASS INDEX: 47.85 KG/M2

## 2022-07-05 DIAGNOSIS — O99.210 OBESITY IN PREGNANCY, ANTEPARTUM: ICD-10-CM

## 2022-07-05 DIAGNOSIS — Z67.91 RH NEGATIVE, ANTEPARTUM: ICD-10-CM

## 2022-07-05 DIAGNOSIS — Z34.80 SUPERVISION OF OTHER NORMAL PREGNANCY, ANTEPARTUM: Primary | ICD-10-CM

## 2022-07-05 DIAGNOSIS — O34.219 PREVIOUS CESAREAN DELIVERY AFFECTING PREGNANCY: ICD-10-CM

## 2022-07-05 DIAGNOSIS — O26.899 RH NEGATIVE, ANTEPARTUM: ICD-10-CM

## 2022-07-05 LAB
GLUCOSE UR STRIP-MCNC: NEGATIVE MG/DL
PROT UR STRIP-MCNC: NEGATIVE MG/DL

## 2022-07-05 PROCEDURE — 99213 OFFICE O/P EST LOW 20 MIN: CPT | Performed by: OBSTETRICS & GYNECOLOGY

## 2022-07-05 NOTE — PROGRESS NOTES
OB FOLLOW UP    CC: Scheduled OB routine FU     Prenatal care complicated by:   Patient Active Problem List   Diagnosis   • Supervision of other normal pregnancy, antepartum   • Previous  delivery affecting pregnancy   • Obesity in pregnancy, antepartum   • Rh negative, antepartum       Subjective:   Patient has: No complaints, No leaking fluid, No vaginal bleeding, Adequate FM  Reports an occasional contraction    Objective:  Urine glucose/protein- see flow sheet      /79   Wt 111 kg (245 lb)   LMP 10/31/2021   BMI 47.85 kg/m²   See OB flow for LE edema, and cvx exam if applicable  FHT: 152 BPM   Uterine Size: 36 cm      Assessment and Plan:  Diagnoses and all orders for this visit:    1. Supervision of other normal pregnancy, antepartum (Primary)  Overview:  EDC: 2022    Prenatal genetic screening: Declined    Previous  delivery  Rh-  Obesity    COVID-19 vaccination: Recommended  Tdap vaccination: Rx 2022  Flu vaccination: Recommended    Anatomy ultrasound 3/29/2022: Normal anatomy.  Posterior/fundal placenta.    Assessment & Plan:  Continue prenatal vitamins  Fetal kick counts  Labor instructions    Orders:  -     POC Urinalysis Dipstick    2. Obesity in pregnancy, antepartum  Overview:  2022: Weekly NSTs ordered    Assessment & Plan:  Discussed exercise, nutrition and appropriate weight gain in pregnancy.  Start NSTs.    Orders:  -     Fetal Nonstress Test; Standing    3. Previous  delivery affecting pregnancy  Overview:  Repeat  delivery: JG 22      4. Rh negative, antepartum  Overview:  RhoGam at 28 weeks: Done 2022          35w2d  Reassuring pregnancy progress    Counseling: OB precautions, leaking, VB, césar barrera vs PTL/Labor  FKC    Questions answered    Return in about 1 week (around 2022) for Recheck.      Mahamed Chowdhury MD  2022

## 2022-07-11 ENCOUNTER — HOSPITAL ENCOUNTER (OUTPATIENT)
Facility: HOSPITAL | Age: 32
Setting detail: SURGERY ADMIT
Discharge: HOME | End: 2022-07-11
Attending: OBSTETRICS & GYNECOLOGY | Admitting: OBSTETRICS & GYNECOLOGY

## 2022-07-11 ENCOUNTER — ROUTINE PRENATAL (OUTPATIENT)
Dept: OBSTETRICS AND GYNECOLOGY | Facility: CLINIC | Age: 32
End: 2022-07-11

## 2022-07-11 VITALS — BODY MASS INDEX: 47.65 KG/M2 | DIASTOLIC BLOOD PRESSURE: 84 MMHG | WEIGHT: 244 LBS | SYSTOLIC BLOOD PRESSURE: 128 MMHG

## 2022-07-11 VITALS — HEART RATE: 109 BPM | SYSTOLIC BLOOD PRESSURE: 101 MMHG | DIASTOLIC BLOOD PRESSURE: 76 MMHG

## 2022-07-11 DIAGNOSIS — Z34.80 SUPERVISION OF OTHER NORMAL PREGNANCY, ANTEPARTUM: Primary | ICD-10-CM

## 2022-07-11 DIAGNOSIS — O99.210 OBESITY IN PREGNANCY, ANTEPARTUM: ICD-10-CM

## 2022-07-11 DIAGNOSIS — O34.219 PREVIOUS CESAREAN DELIVERY AFFECTING PREGNANCY: ICD-10-CM

## 2022-07-11 DIAGNOSIS — Z67.91 RH NEGATIVE, ANTEPARTUM: ICD-10-CM

## 2022-07-11 DIAGNOSIS — O26.899 RH NEGATIVE, ANTEPARTUM: ICD-10-CM

## 2022-07-11 LAB
GLUCOSE UR STRIP-MCNC: NEGATIVE MG/DL
PROT UR STRIP-MCNC: NEGATIVE MG/DL

## 2022-07-11 PROCEDURE — 59025 FETAL NON-STRESS TEST: CPT

## 2022-07-11 PROCEDURE — G0463 HOSPITAL OUTPT CLINIC VISIT: HCPCS

## 2022-07-11 PROCEDURE — 99213 OFFICE O/P EST LOW 20 MIN: CPT | Performed by: OBSTETRICS & GYNECOLOGY

## 2022-07-11 PROCEDURE — 87081 CULTURE SCREEN ONLY: CPT | Performed by: OBSTETRICS & GYNECOLOGY

## 2022-07-11 NOTE — PROGRESS NOTES
OB FOLLOW UP    CC: Scheduled OB routine FU     Prenatal care complicated by:   Patient Active Problem List   Diagnosis   • Supervision of other normal pregnancy, antepartum   • Previous  delivery affecting pregnancy   • Obesity in pregnancy, antepartum   • Rh negative, antepartum       Subjective:   Patient has: No complaints, No leaking fluid, No vaginal bleeding, Adequate FM  Reports an occasional [place] contraction    Objective:  Urine glucose/protein- see flow sheet      /84   Wt 111 kg (244 lb)   LMP 10/31/2021   BMI 47.65 kg/m²   See OB flow for LE edema, and cvx exam if applicable  FHT: 133 BPM   Uterine Size: 37 cm      Assessment and Plan:  Diagnoses and all orders for this visit:    1. Supervision of other normal pregnancy, antepartum (Primary)  Overview:  EDC: 2022    Prenatal genetic screening: Declined    Previous  delivery  Rh-  Obesity    COVID-19 vaccination: Recommended  Tdap vaccination: Rx 2022  Flu vaccination: Recommended    Anatomy ultrasound 3/29/2022: Normal anatomy.  Posterior/fundal placenta.    Assessment & Plan:  GBS collected  Continue prenatal vitamins  Fetal kick counts  Labor instructions      Orders:  -     POC Urinalysis Dipstick  -     Group B Streptococcus Culture - Swab, Vaginal/Rectum    2. Obesity in pregnancy, antepartum  Overview:  2022: Weekly NSTs ordered    Assessment & Plan:  Continue NSTs      3. Previous  delivery affecting pregnancy  Overview:  Repeat  delivery: JG 22      4. Rh negative, antepartum  Overview:  RhoGam at 28 weeks: Done 2022          36w1d  Reassuring pregnancy progress    Counseling: OB precautions, leaking, VB, césar amos vs PTL/Labor  FKC    Questions answered    Return in about 1 week (around 2022) for Recheck.      Mahamed Chowdhury MD  2022

## 2022-07-11 NOTE — NON STRESS TEST
Obstetrical Non-stress Test Interpretation     Name:  Deann Chou  MRN: [id number]    31 y.o. female  at 36w1d    Indication: scheduled NST for elevated BMI      Fetal Assessment  Fetal Movement: active  Fetal HR Assessment Method: external  Fetal HR (beats/min): 135  Fetal HR Baseline: normal range  Fetal HR Variability: moderate (amplitude range 6 to 25 bpm)  Fetal HR Accelerations: greater than/equal to 15 bpm, lasting at least 15 seconds  Fetal HR Decelerations: absent  Sinusoidal Pattern Present: absent    /76   Pulse 109   LMP 10/31/2021     Reason for test: Other (Comment) (scheduled NST for elevated BMI)  Date of Test: 2022  Time frame of test:3380-1144  RN NST Interpretation: Reactive       Babs Rodríguez RN  2022  11:14 EDT

## 2022-07-13 LAB — BACTERIA SPEC AEROBE CULT: NORMAL

## 2022-07-18 ENCOUNTER — ROUTINE PRENATAL (OUTPATIENT)
Dept: OBSTETRICS AND GYNECOLOGY | Facility: CLINIC | Age: 32
End: 2022-07-18

## 2022-07-18 ENCOUNTER — HOSPITAL ENCOUNTER (OUTPATIENT)
Facility: HOSPITAL | Age: 32
Discharge: HOME | End: 2022-07-18
Attending: STUDENT IN AN ORGANIZED HEALTH CARE EDUCATION/TRAINING PROGRAM | Admitting: OBSTETRICS & GYNECOLOGY

## 2022-07-18 ENCOUNTER — HOSPITAL ENCOUNTER (OUTPATIENT)
Dept: LABOR AND DELIVERY | Facility: HOSPITAL | Age: 32
Discharge: HOME | End: 2022-07-18

## 2022-07-18 VITALS — WEIGHT: 246 LBS | DIASTOLIC BLOOD PRESSURE: 84 MMHG | SYSTOLIC BLOOD PRESSURE: 121 MMHG | BODY MASS INDEX: 48.04 KG/M2

## 2022-07-18 VITALS
TEMPERATURE: 98.2 F | RESPIRATION RATE: 16 BRPM | DIASTOLIC BLOOD PRESSURE: 63 MMHG | SYSTOLIC BLOOD PRESSURE: 109 MMHG | HEART RATE: 96 BPM

## 2022-07-18 DIAGNOSIS — Z34.80 SUPERVISION OF OTHER NORMAL PREGNANCY, ANTEPARTUM: Primary | ICD-10-CM

## 2022-07-18 DIAGNOSIS — O99.210 OBESITY IN PREGNANCY, ANTEPARTUM: ICD-10-CM

## 2022-07-18 DIAGNOSIS — O34.219 PREVIOUS CESAREAN DELIVERY AFFECTING PREGNANCY: ICD-10-CM

## 2022-07-18 DIAGNOSIS — Z67.91 RH NEGATIVE, ANTEPARTUM: ICD-10-CM

## 2022-07-18 DIAGNOSIS — O26.899 RH NEGATIVE, ANTEPARTUM: ICD-10-CM

## 2022-07-18 LAB
GLUCOSE UR STRIP-MCNC: NEGATIVE MG/DL
PROT UR STRIP-MCNC: NEGATIVE MG/DL

## 2022-07-18 PROCEDURE — G0463 HOSPITAL OUTPT CLINIC VISIT: HCPCS

## 2022-07-18 PROCEDURE — 59025 FETAL NON-STRESS TEST: CPT | Performed by: STUDENT IN AN ORGANIZED HEALTH CARE EDUCATION/TRAINING PROGRAM

## 2022-07-18 PROCEDURE — 99213 OFFICE O/P EST LOW 20 MIN: CPT | Performed by: OBSTETRICS & GYNECOLOGY

## 2022-07-18 PROCEDURE — 59025 FETAL NON-STRESS TEST: CPT

## 2022-07-18 RX ORDER — CHOLECALCIFEROL (VITAMIN D3) 25 MCG
1 TABLET,CHEWABLE ORAL DAILY
Qty: 90 CAPSULE | Refills: 4 | Status: SHIPPED | OUTPATIENT
Start: 2022-07-18

## 2022-07-18 NOTE — PROGRESS NOTES
OB FOLLOW UP    CC: Scheduled OB routine FU     Prenatal care complicated by:   Patient Active Problem List   Diagnosis   • Supervision of other normal pregnancy, antepartum   • Previous  delivery affecting pregnancy   • Obesity in pregnancy, antepartum   • Rh negative, antepartum       Subjective:   Patient has: No complaints, No leaking fluid, No vaginal bleeding, Adequate FM  Reports occasional contraction    Objective:  Urine glucose/protein- see flow sheet      /84   Wt 112 kg (246 lb)   LMP 10/31/2021   BMI 48.04 kg/m²   See OB flow for LE edema, and cvx exam if applicable  FHT: 142 BPM   Uterine Size: 38 cm      Assessment and Plan:  Diagnoses and all orders for this visit:    1. Supervision of other normal pregnancy, antepartum (Primary)  Overview:  EDC: 2022    Prenatal genetic screening: Declined    Previous  delivery  Rh-  Obesity    COVID-19 vaccination: Recommended  Tdap vaccination: Rx 2022  Flu vaccination: Recommended    Anatomy ultrasound 3/29/2022: Normal anatomy.  Posterior/fundal placenta.    Assessment & Plan:  Continue prenatal vitamins  Fetal kick counts  Labor instructions  GBS is negative    Orders:  -     POC Urinalysis Dipstick  -     Prenatal Vit-Fe Sulfate-FA-DHA (Prenatal Vitamin/Min +DHA) 27-0.8-200 MG capsule; Take 1 tablet by mouth Daily.  Dispense: 90 capsule; Refill: 4    2. Previous  delivery affecting pregnancy  Overview:  Repeat  delivery: JG 22      3. Rh negative, antepartum  Overview:  RhoGam at 28 weeks: Done 2022      4. Obesity in pregnancy, antepartum  Overview:  2022: Weekly NSTs ordered    Assessment & Plan:  Continue NSTs          37w1d  Reassuring pregnancy progress    Counseling: OB precautions, leaking, VB, césar barrera vs PTL/Labor  FKC    Questions answered    Return in about 1 week (around 2022) for Recheck.      Mahamed Chowdhury MD  2022

## 2022-07-18 NOTE — NON STRESS TEST
Obstetrical Non-stress Test Interpretation     Name:  Deann Chou  MRN: [id number]    31 y.o. female  at 37w1d    Indication:increased BMI      Fetal Assessment  Fetal Movement: active  Fetal HR Assessment Method: external  Fetal HR (beats/min): 125  Fetal HR Baseline: normal range  Fetal HR Variability: moderate (amplitude range 6 to 25 bpm)  Fetal HR Accelerations: greater than/equal to 15 bpm, lasting at least 15 seconds  Fetal HR Decelerations: absent  Sinusoidal Pattern Present: absent  Fetal HR Tracing Category: Category I    /63 (BP Location: Right arm)   Pulse 96   Temp 98.2 °F (36.8 °C) (Oral)   Resp 16   LMP 10/31/2021     Reason for test: Other (Comment) (increased BMI)  Date of Test: 2022  Time frame of test:6284-2012  RN NST Interpretation: Reactive      Jocelyne Sterling RN  2022  15:23 EDT

## 2022-07-27 ENCOUNTER — HOSPITAL ENCOUNTER (OUTPATIENT)
Dept: LABOR AND DELIVERY | Facility: HOSPITAL | Age: 32
Discharge: HOME | End: 2022-07-27

## 2022-07-27 ENCOUNTER — PATIENT OUTREACH (OUTPATIENT)
Dept: LABOR AND DELIVERY | Facility: HOSPITAL | Age: 32
End: 2022-07-27

## 2022-07-27 ENCOUNTER — ROUTINE PRENATAL (OUTPATIENT)
Dept: OBSTETRICS AND GYNECOLOGY | Facility: CLINIC | Age: 32
End: 2022-07-27

## 2022-07-27 ENCOUNTER — HOSPITAL ENCOUNTER (OUTPATIENT)
Facility: HOSPITAL | Age: 32
Discharge: HOME | End: 2022-07-27
Attending: OBSTETRICS & GYNECOLOGY | Admitting: OBSTETRICS & GYNECOLOGY

## 2022-07-27 VITALS — SYSTOLIC BLOOD PRESSURE: 113 MMHG | DIASTOLIC BLOOD PRESSURE: 78 MMHG | WEIGHT: 244.3 LBS | BODY MASS INDEX: 47.71 KG/M2

## 2022-07-27 VITALS
TEMPERATURE: 98.5 F | SYSTOLIC BLOOD PRESSURE: 131 MMHG | DIASTOLIC BLOOD PRESSURE: 72 MMHG | RESPIRATION RATE: 20 BRPM | HEART RATE: 99 BPM

## 2022-07-27 DIAGNOSIS — O99.210 OBESITY IN PREGNANCY, ANTEPARTUM: ICD-10-CM

## 2022-07-27 DIAGNOSIS — Z67.91 RH NEGATIVE, ANTEPARTUM: ICD-10-CM

## 2022-07-27 DIAGNOSIS — Z34.80 SUPERVISION OF OTHER NORMAL PREGNANCY, ANTEPARTUM: Primary | ICD-10-CM

## 2022-07-27 DIAGNOSIS — O34.219 PREVIOUS CESAREAN DELIVERY AFFECTING PREGNANCY: ICD-10-CM

## 2022-07-27 DIAGNOSIS — O26.899 RH NEGATIVE, ANTEPARTUM: ICD-10-CM

## 2022-07-27 LAB
GLUCOSE UR STRIP-MCNC: NEGATIVE MG/DL
PROT UR STRIP-MCNC: NEGATIVE MG/DL

## 2022-07-27 PROCEDURE — G0463 HOSPITAL OUTPT CLINIC VISIT: HCPCS

## 2022-07-27 PROCEDURE — 99213 OFFICE O/P EST LOW 20 MIN: CPT | Performed by: OBSTETRICS & GYNECOLOGY

## 2022-07-27 NOTE — OUTREACH NOTE
[facility]    Patient at hospital for NST today. Doing well. Prepared for delivery.    Irene Myles RN  Maternity Nurse Navigator    7/27/2022, 15:55 EDT

## 2022-07-27 NOTE — PROGRESS NOTES
OB FOLLOW UP    CC: Scheduled OB routine FU     Prenatal care complicated by:   Patient Active Problem List   Diagnosis   • Supervision of other normal pregnancy, antepartum   • Previous  delivery affecting pregnancy   • Obesity in pregnancy, antepartum   • Rh negative, antepartum       Subjective:   Patient has: No complaints, No leaking fluid, No vaginal bleeding, Adequate FM  Only reports occasional contractions    Objective:  Urine glucose/protein- see flow sheet      /78   Wt 111 kg (244 lb 4.8 oz)   LMP 10/31/2021   BMI 47.71 kg/m²   See OB flow for LE edema, and cvx exam if applicable  FHT: 139 BPM   Uterine Size: 39 cm      Assessment and Plan:  Diagnoses and all orders for this visit:    1. Supervision of other normal pregnancy, antepartum (Primary)  Overview:  EDC: 2022    Prenatal genetic screening: Declined    Previous  delivery  Rh-  Obesity    COVID-19 vaccination: Recommended  Tdap vaccination: Rx 2022  Flu vaccination: Recommended    Anatomy ultrasound 3/29/2022: Normal anatomy.  Posterior/fundal placenta.    Assessment & Plan:  Continue prenatal vitamins  Fetal kick counts  Labor instructions    Orders:  -     POC Urinalysis Dipstick    2. Previous  delivery affecting pregnancy  Overview:  Repeat  delivery: JG 22    Assessment & Plan:  We have reviewed the risks, benefits, and alternatives of the procedure including the risk of: bleeding, infection, hemorrhage, blood transfusion, risk of injury to nearby structures including: bowl, bladder, pelvic blood vessels and nerves, risk of injury to the baby, risk of anesthesia, venous thromboembolism, myocardial infarction, stroke, and death. We have also discussed the risks of repetitive  deliveries including the risk of abnormal placentation such as placenta accreta. The patient expresses her understanding of these risks and wishes to proceed.      3. Obesity in pregnancy,  antepartum  Overview:  7/5/2022: Weekly NSTs ordered    Assessment & Plan:  Continue NSTs      4. Rh negative, antepartum  Overview:  RhoGam at 28 weeks: Done 5/25/2022          38w3d  Reassuring pregnancy progress    Counseling: OB precautions, leaking, VB, césar barrera vs PTL/Labor  [facility]    Questions answered    Return in about 5 years (around 7/27/2027) for postpartum check.      Mahamed Chowdhury MD  07/27/2022

## 2022-07-29 ENCOUNTER — LAB (OUTPATIENT)
Dept: LAB | Facility: HOSPITAL | Age: 32
End: 2022-07-29

## 2022-07-29 DIAGNOSIS — O34.211 MATERNAL CARE DUE TO LOW TRANSVERSE UTERINE SCAR FROM PREVIOUS CESAREAN DELIVERY: ICD-10-CM

## 2022-07-29 PROCEDURE — U0004 COV-19 TEST NON-CDC HGH THRU: HCPCS

## 2022-07-30 LAB — SARS-COV-2 RNA PNL SPEC NAA+PROBE: NOT DETECTED

## 2022-08-01 ENCOUNTER — ANESTHESIA (OUTPATIENT)
Dept: LABOR AND DELIVERY | Facility: HOSPITAL | Age: 32
End: 2022-08-01

## 2022-08-01 ENCOUNTER — HOSPITAL ENCOUNTER (INPATIENT)
Facility: HOSPITAL | Age: 32
LOS: 2 days | Discharge: HOME | End: 2022-08-03
Attending: OBSTETRICS & GYNECOLOGY | Admitting: OBSTETRICS & GYNECOLOGY

## 2022-08-01 ENCOUNTER — ANESTHESIA EVENT (OUTPATIENT)
Dept: LABOR AND DELIVERY | Facility: HOSPITAL | Age: 32
End: 2022-08-01

## 2022-08-01 LAB
ABO GROUP BLD: NORMAL
BASE EXCESS BLDCOA CALC-SCNC: -0.5 MMOL/L (ref -2–2)
BASE EXCESS BLDCOV CALC-SCNC: -0.4 MMOL/L (ref -2–2)
BASOPHILS # BLD AUTO: 0.05 10*3/MM3 (ref 0–0.2)
BASOPHILS NFR BLD AUTO: 0.4 % (ref 0–1.5)
BLD GP AB SCN SERPL QL: NEGATIVE
DEPRECATED RDW RBC AUTO: 49.1 FL (ref 37–54)
EOSINOPHIL # BLD AUTO: 0.09 10*3/MM3 (ref 0–0.4)
EOSINOPHIL NFR BLD AUTO: 0.8 % (ref 0.3–6.2)
ERYTHROCYTE [DISTWIDTH] IN BLOOD BY AUTOMATED COUNT: 14.6 % (ref 12.3–15.4)
HCO3 BLDCOA-SCNC: 26.6 MMOL/L
HCO3 BLDCOV-SCNC: 26.7 MMOL/L
HCT VFR BLD AUTO: 38.2 % (ref 34–46.6)
HGB BLD-MCNC: 12.6 G/DL (ref 12–15.9)
IMM GRANULOCYTES # BLD AUTO: 0.17 10*3/MM3 (ref 0–0.05)
IMM GRANULOCYTES NFR BLD AUTO: 1.4 % (ref 0–0.5)
LYMPHOCYTES # BLD AUTO: 2.69 10*3/MM3 (ref 0.7–3.1)
LYMPHOCYTES NFR BLD AUTO: 22.7 % (ref 19.6–45.3)
MCH RBC QN AUTO: 30.4 PG (ref 26.6–33)
MCHC RBC AUTO-ENTMCNC: 33 G/DL (ref 31.5–35.7)
MCV RBC AUTO: 92 FL (ref 79–97)
MONOCYTES # BLD AUTO: 0.88 10*3/MM3 (ref 0.1–0.9)
MONOCYTES NFR BLD AUTO: 7.4 % (ref 5–12)
NEUTROPHILS NFR BLD AUTO: 67.3 % (ref 42.7–76)
NEUTROPHILS NFR BLD AUTO: 7.98 10*3/MM3 (ref 1.7–7)
NRBC BLD AUTO-RTO: 0 /100 WBC (ref 0–0.2)
PCO2 BLDCOA: 53 MMHG (ref 33–49)
PCO2 BLDCOV: 53.3 MM HG (ref 28–40)
PH BLDCOA: 7.32 PH UNITS (ref 7.21–7.31)
PH BLDCOV: 7.32 PH UNITS (ref 7.31–7.37)
PLATELET # BLD AUTO: 323 10*3/MM3 (ref 140–450)
PMV BLD AUTO: 9.9 FL (ref 6–12)
PO2 BLDCOA: <40.5 MMHG
PO2 BLDCOV: <40.5 MM HG (ref 21–31)
RBC # BLD AUTO: 4.15 10*6/MM3 (ref 3.77–5.28)
RH BLD: NEGATIVE
T&S EXPIRATION DATE: NORMAL
WBC NRBC COR # BLD: 11.86 10*3/MM3 (ref 3.4–10.8)

## 2022-08-01 PROCEDURE — 25010000002 ONDANSETRON PER 1 MG: Performed by: OBSTETRICS & GYNECOLOGY

## 2022-08-01 PROCEDURE — 25010000002 ONDANSETRON PER 1 MG

## 2022-08-01 PROCEDURE — 25010000002 CEFAZOLIN IN DEXTROSE 2-4 GM/100ML-% SOLUTION: Performed by: OBSTETRICS & GYNECOLOGY

## 2022-08-01 PROCEDURE — 25010000002 FENTANYL CITRATE (PF) 50 MCG/ML SOLUTION: Performed by: ANESTHESIOLOGY

## 2022-08-01 PROCEDURE — 25010000002 METOCLOPRAMIDE PER 10 MG: Performed by: OBSTETRICS & GYNECOLOGY

## 2022-08-01 PROCEDURE — 86850 RBC ANTIBODY SCREEN: CPT | Performed by: OBSTETRICS & GYNECOLOGY

## 2022-08-01 PROCEDURE — 82803 BLOOD GASES ANY COMBINATION: CPT | Performed by: OBSTETRICS & GYNECOLOGY

## 2022-08-01 PROCEDURE — 0 MORPHINE PER 10 MG: Performed by: ANESTHESIOLOGY

## 2022-08-01 PROCEDURE — 59515 CESAREAN DELIVERY: CPT | Performed by: OBSTETRICS & GYNECOLOGY

## 2022-08-01 PROCEDURE — 86901 BLOOD TYPING SEROLOGIC RH(D): CPT | Performed by: OBSTETRICS & GYNECOLOGY

## 2022-08-01 PROCEDURE — S0260 H&P FOR SURGERY: HCPCS | Performed by: OBSTETRICS & GYNECOLOGY

## 2022-08-01 PROCEDURE — 25010000002 KETOROLAC TROMETHAMINE PER 15 MG: Performed by: OBSTETRICS & GYNECOLOGY

## 2022-08-01 PROCEDURE — 63710000001 DIPHENHYDRAMINE PER 50 MG: Performed by: ANESTHESIOLOGY

## 2022-08-01 PROCEDURE — 85025 COMPLETE CBC W/AUTO DIFF WBC: CPT | Performed by: OBSTETRICS & GYNECOLOGY

## 2022-08-01 PROCEDURE — 86900 BLOOD TYPING SEROLOGIC ABO: CPT | Performed by: OBSTETRICS & GYNECOLOGY

## 2022-08-01 RX ORDER — OXYTOCIN 10 [USP'U]/ML
INJECTION, SOLUTION INTRAMUSCULAR; INTRAVENOUS AS NEEDED
Status: DISCONTINUED | OUTPATIENT
Start: 2022-08-01 | End: 2022-08-01 | Stop reason: SURG

## 2022-08-01 RX ORDER — LIDOCAINE HYDROCHLORIDE 10 MG/ML
5 INJECTION, SOLUTION EPIDURAL; INFILTRATION; INTRACAUDAL; PERINEURAL AS NEEDED
Status: DISCONTINUED | OUTPATIENT
Start: 2022-08-01 | End: 2022-08-01 | Stop reason: HOSPADM

## 2022-08-01 RX ORDER — FAMOTIDINE 10 MG/ML
20 INJECTION, SOLUTION INTRAVENOUS ONCE AS NEEDED
Status: DISCONTINUED | OUTPATIENT
Start: 2022-08-01 | End: 2022-08-01 | Stop reason: HOSPADM

## 2022-08-01 RX ORDER — FENTANYL CITRATE 50 UG/ML
INJECTION, SOLUTION INTRAMUSCULAR; INTRAVENOUS
Status: COMPLETED | OUTPATIENT
Start: 2022-08-01 | End: 2022-08-01

## 2022-08-01 RX ORDER — MISOPROSTOL 200 UG/1
800 TABLET ORAL AS NEEDED
Status: DISCONTINUED | OUTPATIENT
Start: 2022-08-01 | End: 2022-08-01 | Stop reason: HOSPADM

## 2022-08-01 RX ORDER — SODIUM CHLORIDE 0.9 % (FLUSH) 0.9 %
3 SYRINGE (ML) INJECTION EVERY 12 HOURS SCHEDULED
Status: DISCONTINUED | OUTPATIENT
Start: 2022-08-01 | End: 2022-08-01 | Stop reason: HOSPADM

## 2022-08-01 RX ORDER — ONDANSETRON 4 MG/1
4 TABLET, FILM COATED ORAL EVERY 6 HOURS PRN
Status: DISCONTINUED | OUTPATIENT
Start: 2022-08-01 | End: 2022-08-01 | Stop reason: HOSPADM

## 2022-08-01 RX ORDER — CARBOPROST TROMETHAMINE 250 UG/ML
250 INJECTION, SOLUTION INTRAMUSCULAR AS NEEDED
Status: DISCONTINUED | OUTPATIENT
Start: 2022-08-01 | End: 2022-08-01 | Stop reason: HOSPADM

## 2022-08-01 RX ORDER — NALOXONE HCL 0.4 MG/ML
0.4 VIAL (ML) INJECTION ONCE AS NEEDED
Status: ACTIVE | OUTPATIENT
Start: 2022-08-01 | End: 2022-08-02

## 2022-08-01 RX ORDER — BISACODYL 10 MG
10 SUPPOSITORY, RECTAL RECTAL DAILY PRN
Status: DISCONTINUED | OUTPATIENT
Start: 2022-08-01 | End: 2022-08-03 | Stop reason: HOSPADM

## 2022-08-01 RX ORDER — METHYLERGONOVINE MALEATE 0.2 MG/ML
200 INJECTION INTRAVENOUS ONCE AS NEEDED
Status: DISCONTINUED | OUTPATIENT
Start: 2022-08-01 | End: 2022-08-01 | Stop reason: HOSPADM

## 2022-08-01 RX ORDER — EPHEDRINE SULFATE 50 MG/ML
INJECTION, SOLUTION INTRAVENOUS AS NEEDED
Status: DISCONTINUED | OUTPATIENT
Start: 2022-08-01 | End: 2022-08-01 | Stop reason: SURG

## 2022-08-01 RX ORDER — OXYCODONE HYDROCHLORIDE 5 MG/1
5 TABLET ORAL EVERY 4 HOURS PRN
Status: DISCONTINUED | OUTPATIENT
Start: 2022-08-01 | End: 2022-08-03 | Stop reason: HOSPADM

## 2022-08-01 RX ORDER — SODIUM CHLORIDE, SODIUM LACTATE, POTASSIUM CHLORIDE, CALCIUM CHLORIDE 600; 310; 30; 20 MG/100ML; MG/100ML; MG/100ML; MG/100ML
150 INJECTION, SOLUTION INTRAVENOUS CONTINUOUS
Status: DISCONTINUED | OUTPATIENT
Start: 2022-08-01 | End: 2022-08-03 | Stop reason: HOSPADM

## 2022-08-01 RX ORDER — OXYCODONE HYDROCHLORIDE 5 MG/1
5 TABLET ORAL
Status: DISCONTINUED | OUTPATIENT
Start: 2022-08-01 | End: 2022-08-01 | Stop reason: HOSPADM

## 2022-08-01 RX ORDER — DIPHENHYDRAMINE HYDROCHLORIDE 50 MG/ML
12.5 INJECTION INTRAMUSCULAR; INTRAVENOUS EVERY 6 HOURS PRN
Status: ACTIVE | OUTPATIENT
Start: 2022-08-01 | End: 2022-08-02

## 2022-08-01 RX ORDER — KETOROLAC TROMETHAMINE 15 MG/ML
15 INJECTION, SOLUTION INTRAMUSCULAR; INTRAVENOUS EVERY 6 HOURS
Status: COMPLETED | OUTPATIENT
Start: 2022-08-01 | End: 2022-08-02

## 2022-08-01 RX ORDER — ALUMINA, MAGNESIA, AND SIMETHICONE 2400; 2400; 240 MG/30ML; MG/30ML; MG/30ML
15 SUSPENSION ORAL EVERY 4 HOURS PRN
Status: DISCONTINUED | OUTPATIENT
Start: 2022-08-01 | End: 2022-08-03 | Stop reason: HOSPADM

## 2022-08-01 RX ORDER — BUTORPHANOL TARTRATE 1 MG/ML
2 INJECTION, SOLUTION INTRAMUSCULAR; INTRAVENOUS EVERY 6 HOURS PRN
Status: ACTIVE | OUTPATIENT
Start: 2022-08-01 | End: 2022-08-02

## 2022-08-01 RX ORDER — ACETAMINOPHEN 325 MG/1
650 TABLET ORAL EVERY 6 HOURS
Status: DISCONTINUED | OUTPATIENT
Start: 2022-08-02 | End: 2022-08-03 | Stop reason: HOSPADM

## 2022-08-01 RX ORDER — SODIUM CHLORIDE, SODIUM LACTATE, POTASSIUM CHLORIDE, CALCIUM CHLORIDE 600; 310; 30; 20 MG/100ML; MG/100ML; MG/100ML; MG/100ML
INJECTION, SOLUTION INTRAVENOUS CONTINUOUS PRN
Status: DISCONTINUED | OUTPATIENT
Start: 2022-08-01 | End: 2022-08-01 | Stop reason: SURG

## 2022-08-01 RX ORDER — METOCLOPRAMIDE HYDROCHLORIDE 5 MG/ML
10 INJECTION INTRAMUSCULAR; INTRAVENOUS
Status: COMPLETED | OUTPATIENT
Start: 2022-08-01 | End: 2022-08-01

## 2022-08-01 RX ORDER — OXYCODONE HYDROCHLORIDE 5 MG/1
10 TABLET ORAL EVERY 4 HOURS PRN
Status: DISCONTINUED | OUTPATIENT
Start: 2022-08-01 | End: 2022-08-03 | Stop reason: HOSPADM

## 2022-08-01 RX ORDER — ACETAMINOPHEN 500 MG
1000 TABLET ORAL EVERY 6 HOURS
Status: DISPENSED | OUTPATIENT
Start: 2022-08-01 | End: 2022-08-02

## 2022-08-01 RX ORDER — OXYTOCIN/0.9 % SODIUM CHLORIDE 30/500 ML
125 PLASTIC BAG, INJECTION (ML) INTRAVENOUS ONCE
Status: DISCONTINUED | OUTPATIENT
Start: 2022-08-01 | End: 2022-08-01 | Stop reason: HOSPADM

## 2022-08-01 RX ORDER — ONDANSETRON 2 MG/ML
4 INJECTION INTRAMUSCULAR; INTRAVENOUS EVERY 6 HOURS PRN
Status: DISCONTINUED | OUTPATIENT
Start: 2022-08-01 | End: 2022-08-03 | Stop reason: HOSPADM

## 2022-08-01 RX ORDER — SODIUM CHLORIDE 0.9 % (FLUSH) 0.9 %
10 SYRINGE (ML) INJECTION AS NEEDED
Status: DISCONTINUED | OUTPATIENT
Start: 2022-08-01 | End: 2022-08-01 | Stop reason: HOSPADM

## 2022-08-01 RX ORDER — DIPHENHYDRAMINE HCL 25 MG
25 CAPSULE ORAL EVERY 6 HOURS PRN
Status: DISPENSED | OUTPATIENT
Start: 2022-08-01 | End: 2022-08-02

## 2022-08-01 RX ORDER — BUPIVACAINE HYDROCHLORIDE 7.5 MG/ML
INJECTION, SOLUTION EPIDURAL; RETROBULBAR
Status: COMPLETED | OUTPATIENT
Start: 2022-08-01 | End: 2022-08-01

## 2022-08-01 RX ORDER — FAMOTIDINE 20 MG/1
20 TABLET, FILM COATED ORAL ONCE AS NEEDED
Status: DISCONTINUED | OUTPATIENT
Start: 2022-08-01 | End: 2022-08-01 | Stop reason: HOSPADM

## 2022-08-01 RX ORDER — CALCIUM CARBONATE 200(500)MG
1 TABLET,CHEWABLE ORAL EVERY 4 HOURS PRN
Status: DISCONTINUED | OUTPATIENT
Start: 2022-08-01 | End: 2022-08-03 | Stop reason: HOSPADM

## 2022-08-01 RX ORDER — HYDROMORPHONE HCL 110MG/55ML
0.25 PATIENT CONTROLLED ANALGESIA SYRINGE INTRAVENOUS
Status: DISCONTINUED | OUTPATIENT
Start: 2022-08-01 | End: 2022-08-01 | Stop reason: HOSPADM

## 2022-08-01 RX ORDER — MEPERIDINE HYDROCHLORIDE 25 MG/ML
12.5 INJECTION INTRAMUSCULAR; INTRAVENOUS; SUBCUTANEOUS
Status: DISCONTINUED | OUTPATIENT
Start: 2022-08-01 | End: 2022-08-01 | Stop reason: HOSPADM

## 2022-08-01 RX ORDER — PHENYLEPHRINE HCL IN 0.9% NACL 1 MG/10 ML
SYRINGE (ML) INTRAVENOUS AS NEEDED
Status: DISCONTINUED | OUTPATIENT
Start: 2022-08-01 | End: 2022-08-01 | Stop reason: SURG

## 2022-08-01 RX ORDER — MORPHINE SULFATE 0.5 MG/ML
INJECTION, SOLUTION EPIDURAL; INTRATHECAL; INTRAVENOUS
Status: COMPLETED | OUTPATIENT
Start: 2022-08-01 | End: 2022-08-01

## 2022-08-01 RX ORDER — ONDANSETRON 2 MG/ML
INJECTION INTRAMUSCULAR; INTRAVENOUS AS NEEDED
Status: DISCONTINUED | OUTPATIENT
Start: 2022-08-01 | End: 2022-08-01 | Stop reason: SURG

## 2022-08-01 RX ORDER — TRANEXAMIC ACID 10 MG/ML
1000 INJECTION, SOLUTION INTRAVENOUS ONCE AS NEEDED
Status: DISCONTINUED | OUTPATIENT
Start: 2022-08-01 | End: 2022-08-01 | Stop reason: HOSPADM

## 2022-08-01 RX ORDER — DOCUSATE SODIUM 100 MG/1
100 CAPSULE, LIQUID FILLED ORAL 2 TIMES DAILY
Status: DISCONTINUED | OUTPATIENT
Start: 2022-08-01 | End: 2022-08-03 | Stop reason: HOSPADM

## 2022-08-01 RX ORDER — KETOROLAC TROMETHAMINE 30 MG/ML
30 INJECTION, SOLUTION INTRAMUSCULAR; INTRAVENOUS ONCE
Status: COMPLETED | OUTPATIENT
Start: 2022-08-01 | End: 2022-08-01

## 2022-08-01 RX ORDER — IBUPROFEN 600 MG/1
600 TABLET ORAL EVERY 6 HOURS
Status: DISCONTINUED | OUTPATIENT
Start: 2022-08-02 | End: 2022-08-03 | Stop reason: HOSPADM

## 2022-08-01 RX ORDER — ONDANSETRON 2 MG/ML
4 INJECTION INTRAMUSCULAR; INTRAVENOUS EVERY 6 HOURS PRN
Status: DISCONTINUED | OUTPATIENT
Start: 2022-08-01 | End: 2022-08-01 | Stop reason: HOSPADM

## 2022-08-01 RX ORDER — TRISODIUM CITRATE DIHYDRATE AND CITRIC ACID MONOHYDRATE 500; 334 MG/5ML; MG/5ML
30 SOLUTION ORAL ONCE
Status: COMPLETED | OUTPATIENT
Start: 2022-08-01 | End: 2022-08-01

## 2022-08-01 RX ORDER — CEFAZOLIN SODIUM 2 G/100ML
2 INJECTION, SOLUTION INTRAVENOUS ONCE
Status: COMPLETED | OUTPATIENT
Start: 2022-08-01 | End: 2022-08-01

## 2022-08-01 RX ORDER — HYDROMORPHONE HCL 110MG/55ML
0.5 PATIENT CONTROLLED ANALGESIA SYRINGE INTRAVENOUS
Status: DISCONTINUED | OUTPATIENT
Start: 2022-08-01 | End: 2022-08-01 | Stop reason: HOSPADM

## 2022-08-01 RX ADMIN — SODIUM CHLORIDE, POTASSIUM CHLORIDE, SODIUM LACTATE AND CALCIUM CHLORIDE 150 ML/HR: 600; 310; 30; 20 INJECTION, SOLUTION INTRAVENOUS at 14:24

## 2022-08-01 RX ADMIN — SODIUM CHLORIDE, POTASSIUM CHLORIDE, SODIUM LACTATE AND CALCIUM CHLORIDE 600 ML: 600; 310; 30; 20 INJECTION, SOLUTION INTRAVENOUS at 07:29

## 2022-08-01 RX ADMIN — ACETAMINOPHEN 1000 MG: 500 TABLET ORAL at 20:45

## 2022-08-01 RX ADMIN — KETOROLAC TROMETHAMINE 30 MG: 30 INJECTION, SOLUTION INTRAMUSCULAR; INTRAVENOUS at 09:44

## 2022-08-01 RX ADMIN — BUPIVACAINE HYDROCHLORIDE 1.6 ML: 7.5 INJECTION, SOLUTION EPIDURAL; RETROBULBAR at 07:32

## 2022-08-01 RX ADMIN — FENTANYL CITRATE 10 MCG: 50 INJECTION, SOLUTION INTRAMUSCULAR; INTRAVENOUS at 07:32

## 2022-08-01 RX ADMIN — EPHEDRINE SULFATE 7.5 MG: 50 INJECTION INTRAVENOUS at 08:03

## 2022-08-01 RX ADMIN — SODIUM CITRATE AND CITRIC ACID MONOHYDRATE 30 ML: 500; 334 SOLUTION ORAL at 07:09

## 2022-08-01 RX ADMIN — OXYTOCIN 30 UNITS: 10 INJECTION, SOLUTION INTRAMUSCULAR; INTRAVENOUS at 08:06

## 2022-08-01 RX ADMIN — Medication 100 MCG: at 07:35

## 2022-08-01 RX ADMIN — DIPHENHYDRAMINE HYDROCHLORIDE 25 MG: 25 CAPSULE ORAL at 18:14

## 2022-08-01 RX ADMIN — OXYTOCIN 10 UNITS: 10 INJECTION, SOLUTION INTRAMUSCULAR; INTRAVENOUS at 07:52

## 2022-08-01 RX ADMIN — ACETAMINOPHEN 1000 MG: 500 TABLET ORAL at 12:01

## 2022-08-01 RX ADMIN — SODIUM CHLORIDE, POTASSIUM CHLORIDE, SODIUM LACTATE AND CALCIUM CHLORIDE 150 ML: 600; 310; 30; 20 INJECTION, SOLUTION INTRAVENOUS at 07:53

## 2022-08-01 RX ADMIN — MORPHINE SULFATE 0.1 MG: 0.5 INJECTION, SOLUTION EPIDURAL; INTRATHECAL; INTRAVENOUS at 07:32

## 2022-08-01 RX ADMIN — Medication 100 MCG: at 07:57

## 2022-08-01 RX ADMIN — ONDANSETRON 4 MG: 2 INJECTION INTRAMUSCULAR; INTRAVENOUS at 10:50

## 2022-08-01 RX ADMIN — CEFAZOLIN SODIUM 2 G: 2 INJECTION, SOLUTION INTRAVENOUS at 07:09

## 2022-08-01 RX ADMIN — Medication 100 MCG: at 08:00

## 2022-08-01 RX ADMIN — SODIUM CHLORIDE, POTASSIUM CHLORIDE, SODIUM LACTATE AND CALCIUM CHLORIDE: 600; 310; 30; 20 INJECTION, SOLUTION INTRAVENOUS at 08:05

## 2022-08-01 RX ADMIN — Medication 100 MCG: at 07:39

## 2022-08-01 RX ADMIN — SODIUM CHLORIDE, POTASSIUM CHLORIDE, SODIUM LACTATE AND CALCIUM CHLORIDE 1000 ML: 600; 310; 30; 20 INJECTION, SOLUTION INTRAVENOUS at 06:41

## 2022-08-01 RX ADMIN — SODIUM CHLORIDE, POTASSIUM CHLORIDE, SODIUM LACTATE AND CALCIUM CHLORIDE 250 ML: 600; 310; 30; 20 INJECTION, SOLUTION INTRAVENOUS at 08:02

## 2022-08-01 RX ADMIN — DOCUSATE SODIUM 100 MG: 100 CAPSULE, LIQUID FILLED ORAL at 12:02

## 2022-08-01 RX ADMIN — SODIUM CHLORIDE, POTASSIUM CHLORIDE, SODIUM LACTATE AND CALCIUM CHLORIDE 150 ML/HR: 600; 310; 30; 20 INJECTION, SOLUTION INTRAVENOUS at 09:44

## 2022-08-01 RX ADMIN — ONDANSETRON 4 MG: 2 INJECTION INTRAMUSCULAR; INTRAVENOUS at 16:54

## 2022-08-01 RX ADMIN — KETOROLAC TROMETHAMINE 15 MG: 15 INJECTION, SOLUTION INTRAMUSCULAR; INTRAVENOUS at 15:16

## 2022-08-01 RX ADMIN — ONDANSETRON 4 MG: 2 INJECTION INTRAMUSCULAR; INTRAVENOUS at 07:55

## 2022-08-01 RX ADMIN — SODIUM CHLORIDE, POTASSIUM CHLORIDE, SODIUM LACTATE AND CALCIUM CHLORIDE 150 ML/HR: 600; 310; 30; 20 INJECTION, SOLUTION INTRAVENOUS at 22:40

## 2022-08-01 RX ADMIN — METOCLOPRAMIDE HYDROCHLORIDE 10 MG: 5 INJECTION INTRAMUSCULAR; INTRAVENOUS at 07:09

## 2022-08-01 NOTE — ANESTHESIA PROCEDURE NOTES
Spinal Block      Patient reassessed immediately prior to procedure    Patient location during procedure: OB  Start Time: 8/1/2022 7:32 AM  Indication:at surgeon's request and post-op pain management  Performed By  CRNA/CAA: Brenton Elias CRNA  Preanesthetic Checklist  Completed: patient identified, IV checked, risks and benefits discussed, surgical consent, monitors and equipment checked, pre-op evaluation and timeout performed  Spinal Block Prep:  Patient Position:sitting  Sterile Tech:cap, gloves, mask and sterile barriers  Prep:Chloraprep  Patient Monitoring:blood pressure monitoring, continuous pulse oximetry and EKG  Spinal Block Procedure  Approach:midline  Guidance:landmark technique and palpation technique  Location:L3-L4  Needle Type:Pencan  Needle Gauge:24 G  Placement of Spinal needle event:cerebrospinal fluid aspirated  Paresthesia: no  Fluid Appearance:clear  Medications: morphine PF (DURAMORPH) injection, 0.1 mg  fentaNYL (SUBLIMAZE) injection, 10 mcg  bupivacaine PF (MARCAINE) injection 0.75%, 1.6 mL  Med Administered at 8/1/2022 7:32 AM   Post Assessment  Patient Tolerance:patient tolerated the procedure well with no apparent complications  Complications no  Additional Notes  Skin infiltration with Lidocaine 1%. Introducer inserted, followed by spinal needle. + CSF return. Intrathecal marcaine and PF duramorph/fentanyl injected (see eMAR). Spinal needle/introducer removed. Site clean/dry/intact.

## 2022-08-01 NOTE — H&P
BRITTNI Todd  Obstetric History and Physical    Chief Complaint:  Scheduled CS    Subjective:  The patient is a 31 y.o.  currently at 39w1d, who presents for a scheduled  delivery at term.  She has no complaints.  She denies any vaginal bleeding, loss of fluid, or decreased fetal movement.    Her prenatal care is complicated by: Elevated BMI- pre-pregnancy, Prior     The following portions of the patients history were reviewed and updated as appropriate: current medications, allergies, past medical history, past surgical history, past family history, past social history and problem list .     Prenatal Information:  Prenatal Results     POC Urine Glucose/Protein     Test Value Reference Range Date Time    Urine Glucose  Negative mg/dL Negative 22 1528    Urine Protein  Negative mg/dL Negative 22 1528          Initial Prenatal Labs     Test Value Reference Range Date Time    Hemoglobin        Hematocrit        Platelets        Rubella IgG  4.27 index Immune >0.99 22 1433    Hepatitis B SAg  Non-Reactive  Non-Reactive 22 1433    Hepatitis C Ab  Non-Reactive  Non-Reactive 22 1433    RPR  Non-Reactive  Non-Reactive 22 1433    ABO  A   22 1016    Rh  Negative   22 1016    Antibody Screen        HIV  Non-Reactive  Non-Reactive 22 1433    Urine Culture  No growth   22 1332       >100,000 CFU/mL Escherichia coli   22 1430    Gonorrhea  Negative  Negative 22 1430    Chlamydia  Negative  Negative 22 1430    TSH        HgB A1c               2nd and 3rd Trimester     Test Value Reference Range Date Time    Hemoglobin (repeated)  12.6 g/dL 12.0 - 15.9 22 0644       11.7 g/dL 12.0 - 15.9 22 1421       12.4 g/dL 12.0 - 15.9 22 1433    Hematocrit (repeated)  38.2 % 34.0 - 46.6 22 0644       34.9 % 34.0 - 46.6 22 1421       38.8 % 34.0 - 46.6 22 1433    Platelets   323 10*3/mm3 140 - 450 22 0644        343 10*3/mm3 140 - 450 04/26/22 1421       387 10*3/mm3 140 - 450 02/08/22 1433    GCT  119 mg/dL 65 - 139 04/26/22 1421    Antibody Screen (repeated)  Negative   05/25/22 1016       Negative   02/08/22 1433    GTT Fasting        GTT 1 Hr        GTT 2 Hr        GTT 3 Hr        Group B Strep  No Group B Streptococcus isolated   07/11/22 1542          Drug Screening     Test Value Reference Range Date Time    Amphetamine Screen        Barbiturate Screen        Benzodiazepine Screen        Methadone Screen        Phencyclidine Screen        Opiates Screen        THC Screen        Cocaine Screen        Propoxyphene Screen        Buprenorphine Screen        Methamphetamine Screen        Oxycodone Screen        Tricyclic Antidepressants Screen              Other (Risk screening)     Test Value Reference Range Date Time    Varicella IgG        Parvovirus IgG        CMV IgG        Cystic Fibrosis        Hemoglobin electrophoresis        NIPT        MSAFP-4        AFP (for NTD only)              Legend    ^: Historical                      External Prenatal Results     Pregnancy Outside Results - Transcribed From Office Records - See Scanned Records For Details     Test Value Date Time    ABO  A  05/25/22 1016    Rh  Negative  05/25/22 1016    Antibody Screen  Negative  05/25/22 1016       Negative  02/08/22 1433    Varicella IgG       Rubella  4.27 index 02/08/22 1433    Hgb  12.6 g/dL 08/01/22 0644       11.7 g/dL 04/26/22 1421       12.4 g/dL 02/08/22 1433    Hct  38.2 % 08/01/22 0644       34.9 % 04/26/22 1421       38.8 % 02/08/22 1433    Glucose Fasting GTT       Glucose Tolerance Test 1 hour       Glucose Tolerance Test 3 hour       Gonorrhea (discrete)  Negative  02/08/22 1430    Chlamydia (discrete)  Negative  02/08/22 1430    RPR  Non-Reactive  02/08/22 1433    VDRL       Syphilis Antibody       HBsAg  Non-Reactive  02/08/22 1433    Herpes Simplex Virus PCR       Herpes Simplex VIrus Culture       HIV   Non-Reactive  22 1433    Hep C RNA Quant PCR       Hep C Antibody  Non-Reactive  22 1433    AFP       Group B Strep  No Group B Streptococcus isolated  22 1542    GBS Susceptibility to Clindamycin       GBS Susceptibility to Erythromycin       Fetal Fibronectin       Genetic Testing, Maternal Blood             Drug Screening     Test Value Date Time    Urine Drug Screen       Amphetamine Screen       Barbiturate Screen       Benzodiazepine Screen       Methadone Screen       Phencyclidine Screen       Opiates Screen       THC Screen       Cocaine Screen       Propoxyphene Screen       Buprenorphine Screen       Methamphetamine Screen       Oxycodone Screen       Tricyclic Antidepressants Screen             Legend    ^: Historical                        Past OB History:  OB History    Para Term  AB Living   2 1 1 0 0 1   SAB IAB Ectopic Molar Multiple Live Births   0 0 0 0 0 1      # Outcome Date GA Lbr Adolfo/2nd Weight Sex Delivery Anes PTL Lv   2 Current            1 Term 19 40w0d  3033 g (6 lb 11 oz) F CS-LTranv   NELSON      Complications: Failure to Progress in First Stage       Past Medical History:  History reviewed. No pertinent past medical history.    Past Surgical History:  Past Surgical History:   Procedure Laterality Date   •  SECTION  2019       Family History:  Family History   Problem Relation Age of Onset   • Colon cancer Father    • Diabetes Paternal Grandmother    • Colon cancer Paternal Grandmother        Social History:   reports that she has never smoked. She has never used smokeless tobacco.   reports previous alcohol use.   reports no history of drug use.    Medications:  Prenatal Vitamin/Min +DHA and prenatal vitamin    Allergies:  No Known Allergies    Review of Systems:  No leaking fluid, No vaginal bleeding, Adequate FM, No HA, No scotomata or vision changes, No RUQ/epigastric pain, No fever/chills, No nausea, No vomiting, No diarrhea, No  constipation, No abdominal pain, No back pain, Contractions and Swelling    Objective     Vital Signs:        Physical Exam:    General:  alert, well appearing, in no apparent distress  HEENT: PERRLA, extra ocular movement intact, sclera clear, anicteric and neck supple with midline trachea  Cardiovascular: normal rate, regular rhythm,  no murmurs, rubs, or gallops  Lungs: clear to auscultation, no wheezes, rales or rhonchi, symmetric air entry  Abdomen: soft, nontender, nondistended, no abnormal masses, no epigastric pain, fundus soft, nontender 39 weeks size and estimated fetal weight: 7.5 lbs  Extremities: 1+ edema, no redness or tenderness in the calves or thighs 2+ bilaterally    Fetal Assessment:    FHR:   Baseline: 130  Variability: Moderate  Accelerations: Present (32 weeks+) 15 x 15 bpm  Decelerations: Isolated late deceleration  Category: Category 2    Contractions: Irregular    Fetal Presentation: cephalic    Labs:   Lab Results (last 24 hours)     Procedure Component Value Units Date/Time    CBC & Differential [[id number]]  (Abnormal) Collected: 08/01/22 0644    Specimen: Blood Updated: 08/01/22 0655    Narrative:      The following orders were created for panel order CBC & Differential.  Procedure                               Abnormality         Status                     ---------                               -----------         ------                     CBC Auto Differential[[id number]]        Abnormal            Final result                 Please view results for these tests on the individual orders.    CBC Auto Differential [[id number]]  (Abnormal) Collected: 08/01/22 0644    Specimen: Blood Updated: 08/01/22 0655     WBC 11.86 10*3/mm3      RBC 4.15 10*6/mm3      Hemoglobin 12.6 g/dL      Hematocrit 38.2 %      MCV 92.0 fL      MCH 30.4 pg      MCHC 33.0 g/dL      RDW 14.6 %      RDW-SD 49.1 fl      MPV 9.9 fL      Platelets 323 10*3/mm3      Neutrophil % 67.3 %      Lymphocyte % 22.7 %       Monocyte % 7.4 %      Eosinophil % 0.8 %      Basophil % 0.4 %      Immature Grans % 1.4 %      Neutrophils, Absolute 7.98 10*3/mm3      Lymphocytes, Absolute 2.69 10*3/mm3      Monocytes, Absolute 0.88 10*3/mm3      Eosinophils, Absolute 0.09 10*3/mm3      Basophils, Absolute 0.05 10*3/mm3      Immature Grans, Absolute 0.17 10*3/mm3      nRBC 0.0 /100 WBC            Hospital Problems:    Previous  delivery affecting pregnancy      Assessment:  31 y.o.  currently at 39w1d    Previous  delivery affecting pregnancy    GBS: Negative    Plan:    We have reviewed the risks, benefits, and alternatives of the procedure including the risk of: bleeding, infection, hemorrhage, blood transfusion, risk of injury to nearby structures including: bowl, bladder, pelvic blood vessels and nerves, risk of injury to the baby, risk of anesthesia, venous thromboembolism, myocardial infarction, stroke, and death. We have also discussed the risks of repetitive  deliveries including the risk of abnormal placentation such as placenta accreta. The patient expresses her understanding of these risks and wishes to proceed.  Plan of care has been reviewed with patient  Risks, benefits of treatment plan have been discussed.  All questions have been answered.    Mahamed Chowdhury MD  2022  07:08 EDT

## 2022-08-01 NOTE — ANESTHESIA PREPROCEDURE EVALUATION
Anesthesia Evaluation     Patient summary reviewed and Nursing notes reviewed   no history of anesthetic complications:  NPO Solid Status: > 8 hours  NPO Liquid Status: > 2 hours           Airway   Mallampati: II  TM distance: >3 FB  Neck ROM: full  No difficulty expected  Dental      Pulmonary - negative pulmonary ROS and normal exam    breath sounds clear to auscultation  Cardiovascular - negative cardio ROS and normal exam  Exercise tolerance: good (4-7 METS)    Rhythm: regular  Rate: normal        Neuro/Psych- negative ROS  GI/Hepatic/Renal/Endo    (+) obesity,       Musculoskeletal (-) negative ROS    Abdominal    Substance History - negative use     OB/GYN    (+) Pregnant,         Other - negative ROS                       Anesthesia Plan    ASA 3     spinal       Anesthetic plan, risks, benefits, and alternatives have been provided, discussed and informed consent has been obtained with: patient.    Plan discussed with CRNA.        CODE STATUS:    Level Of Support Discussed With: Patient  Code Status (Patient has no pulse and is not breathing): CPR (Attempt to Resuscitate)  Medical Interventions (Patient has pulse or is breathing): Full

## 2022-08-01 NOTE — OP NOTE
Todd   Section Operative Note    Pre-Operative Dx:   1.  IUP at Gestational Age: 39w1d  weeks    2.  Elevated BMI  3.  Reviewed  delivery      Postoperative dx:    1.  Same     Procedure: Procedure(s):   SECTION REPEAT   Surgeon/Assistant: Surgeon(s):  Mahamed Chowdhury MD         Anesthesia:  Anesthesiologist: Choice  Anesthesiologist: Nomi Mcdaniel MD  CRNA: Brenton Elias CRNA     EBL: 800 mls.                  Antibiotics: clindamycin (Cleocin)     Infant:           Gender: female  infant    Weight: 3350 g (7 lb 6.2 oz)     Apgars: 8  @ 1 minute /     9  @ 5 minutes    Cord gases: Venous:    pH, Cord Venous   Date Value Ref Range Status   2022 7.317 7.310 - 7.370 pH Units Final        Arterial:    pH, Cord Arterial   Date Value Ref Range Status   2022 7.32 (H) 7.21 - 7.31 pH Units Final        Indication for C/Section:  Prior C/S         Priority for C/Section:  routine      Procedure Details:   After reviewing the informed consent, the patient expressed her understanding and desire to proceed.  She was taken to the operating room with an IV in place and running.  She was placed on the operating table in the sitting position.  The patient was given a spinal anesthetic.  She was placed in the dorsal supine position with leftward tilt.  A sterile Castellano catheter was inserted into the patient's bladder.  The patient was then prepped and draped in the usual sterile fashion. After a surgical timeout was performed, and the patient's anesthesia was found to be adequate I made a Pfannenstiel skin incision with the scalpel.  The incision was carried down to the underlying fascia with the cautery. The fascia was nicked in the midline. The fascia was extended laterally, in a curvilinear fashion with Mcallister scissors.  I used 2 kochers to grasp the superior portion of the fascia, and this was taken off the rectus muscle sharply.  The kochers were removed and replaced  on the posterior portion of the fascia. The fascia was taken off the rectus muscle sharply.  The midline was identified, tented up with 2 hemostats, and entered sharply.  The peritoneum was extended in a sharp fashion with good visualization of the bladder.  The Keagan retractor was placed.  The bladder flap was developed sharply.  I made a low transverse uterine incision with the scalpel.  The uterine cavity was entered bluntly, and membranes were artificially ruptured.  The fluid color was clear.  The fetal head was gently lifted to the hysterotomy, and the baby was delivered with gentle fundal pressure at 7:50 AM.  After complete delivery of the infant, the mouth and nose were bulb suctioned, the cord was doubly clamped and cut, and the baby was passed off to the awaiting nurses. Apgars were 8 and 9 at 1 and 5 minutes respectively. The baby's birth weight was 7 pounds 6 ounces. Cord blood and gases were collected, and the placenta was delivered manually and intact at 7:52 AM.  The uterus was then exteriorized, and the endometrium was curetted with a dry lap.  The hysterotomy was then closed in 2 layers.  The first layer was closed with a running 0 Monocryl suture.  The second layer was closed with a horizontal imbricating 0 Monocryl suture.  There was some bleeding in the midline of the hysterotomy.  2 figure-of-eight 0 Vicryl sutures were placed.  This achieved  excellent hemostasis.  I irrigated posterior to the uterus.  The uterus was reinserted into the peritoneal cavity.  The paracolic gutters were copiously irrigated.  The hysterotomy was reinspected, and noted to be hemostatic.  3 Jenni clamps were used to grasp the peritoneum, and the peritoneum was closed with a 3-0 Monocryl suture in a running fashion.  The rectus muscle was reapproximated with 3 interrupted horizontal mattress sutures using a #1 chromic suture.  The fascia was then closed in a running fashion using a 0 Vicryl suture.  The subcutaneous  spaces were copiously irrigated, and hemostasis was achieved with the cautery.  The subcutaneous space was then reapproximated with a 3-0 Monocryl suture in a running fashion.  The skin was closed with a 4-0 Monocryl suture in a subcuticular fashion.  A sterile towels applied over the incision.  The patient was placed in a frog-leg position, and the drapes were removed.  I expressed any remaining blood and clot from the uterus.  A sterile bandage was applied to the incision, and the patient was transferred to the recovery room in satisfactory condition.  The patient received Kefzol as her preoperative antibiotics.  All counts were correct x2 at the end of the procedure.  Both mother and  are recovering in excellent condition in the recovery area.      Complications:   None       Mahamed Chowdhury MD  2022  08:27 EDT

## 2022-08-02 LAB
BASOPHILS # BLD AUTO: 0.04 10*3/MM3 (ref 0–0.2)
BASOPHILS NFR BLD AUTO: 0.3 % (ref 0–1.5)
DEPRECATED RDW RBC AUTO: 50.2 FL (ref 37–54)
EOSINOPHIL # BLD AUTO: 0.05 10*3/MM3 (ref 0–0.4)
EOSINOPHIL NFR BLD AUTO: 0.4 % (ref 0.3–6.2)
ERYTHROCYTE [DISTWIDTH] IN BLOOD BY AUTOMATED COUNT: 14.6 % (ref 12.3–15.4)
HCT VFR BLD AUTO: 33.4 % (ref 34–46.6)
HGB BLD-MCNC: 10.9 G/DL (ref 12–15.9)
IMM GRANULOCYTES # BLD AUTO: 0.16 10*3/MM3 (ref 0–0.05)
IMM GRANULOCYTES NFR BLD AUTO: 1.2 % (ref 0–0.5)
LYMPHOCYTES # BLD AUTO: 2.21 10*3/MM3 (ref 0.7–3.1)
LYMPHOCYTES NFR BLD AUTO: 16.6 % (ref 19.6–45.3)
MCH RBC QN AUTO: 30.4 PG (ref 26.6–33)
MCHC RBC AUTO-ENTMCNC: 32.6 G/DL (ref 31.5–35.7)
MCV RBC AUTO: 93 FL (ref 79–97)
MONOCYTES # BLD AUTO: 0.79 10*3/MM3 (ref 0.1–0.9)
MONOCYTES NFR BLD AUTO: 5.9 % (ref 5–12)
NEUTROPHILS NFR BLD AUTO: 10.07 10*3/MM3 (ref 1.7–7)
NEUTROPHILS NFR BLD AUTO: 75.6 % (ref 42.7–76)
NRBC BLD AUTO-RTO: 0 /100 WBC (ref 0–0.2)
PLATELET # BLD AUTO: 286 10*3/MM3 (ref 140–450)
PMV BLD AUTO: 10 FL (ref 6–12)
RBC # BLD AUTO: 3.59 10*6/MM3 (ref 3.77–5.28)
WBC NRBC COR # BLD: 13.32 10*3/MM3 (ref 3.4–10.8)

## 2022-08-02 PROCEDURE — 85025 COMPLETE CBC W/AUTO DIFF WBC: CPT | Performed by: OBSTETRICS & GYNECOLOGY

## 2022-08-02 PROCEDURE — 25010000002 KETOROLAC TROMETHAMINE PER 15 MG: Performed by: OBSTETRICS & GYNECOLOGY

## 2022-08-02 RX ADMIN — DOCUSATE SODIUM 100 MG: 100 CAPSULE, LIQUID FILLED ORAL at 00:07

## 2022-08-02 RX ADMIN — ACETAMINOPHEN 650 MG: 325 TABLET ORAL at 12:25

## 2022-08-02 RX ADMIN — ACETAMINOPHEN 1000 MG: 500 TABLET ORAL at 06:25

## 2022-08-02 RX ADMIN — DOCUSATE SODIUM 100 MG: 100 CAPSULE, LIQUID FILLED ORAL at 21:37

## 2022-08-02 RX ADMIN — IBUPROFEN 600 MG: 600 TABLET ORAL at 21:37

## 2022-08-02 RX ADMIN — KETOROLAC TROMETHAMINE 15 MG: 15 INJECTION, SOLUTION INTRAMUSCULAR; INTRAVENOUS at 00:07

## 2022-08-02 RX ADMIN — KETOROLAC TROMETHAMINE 15 MG: 15 INJECTION, SOLUTION INTRAMUSCULAR; INTRAVENOUS at 06:25

## 2022-08-02 RX ADMIN — KETOROLAC TROMETHAMINE 15 MG: 15 INJECTION, SOLUTION INTRAMUSCULAR; INTRAVENOUS at 10:34

## 2022-08-02 RX ADMIN — ACETAMINOPHEN 650 MG: 325 TABLET ORAL at 18:33

## 2022-08-02 RX ADMIN — DOCUSATE SODIUM 100 MG: 100 CAPSULE, LIQUID FILLED ORAL at 08:04

## 2022-08-02 RX ADMIN — IBUPROFEN 600 MG: 600 TABLET ORAL at 16:47

## 2022-08-02 NOTE — ANESTHESIA POSTPROCEDURE EVALUATION
Patient: Deann Chou    Procedure Summary     Date: 22 Room / Location: [facility] LABOR DELIVERY  [facility] LABOR DELIVERY    Anesthesia Start: 729 Anesthesia Stop: 820    Procedure:  SECTION REPEAT (N/A Abdomen) Diagnosis:     Surgeons: Mahamed Chowdhury MD Provider: Nomi Mcdaniel MD    Anesthesia Type: spinal ASA Status: 3          Anesthesia Type: spinal    Vitals  Vitals Value Taken Time   /56 22 0807   Temp 36.3 °C (97.34 °F) 22 0807   Pulse 92 22 0807   Resp 18 22 0550   SpO2 96 % 22 1059   Vitals shown include unvalidated device data.        Post Anesthesia Care and Evaluation    Patient location during evaluation: bedside  Patient participation: complete - patient participated  Level of consciousness: awake  Pain management: adequate    Airway patency: patent  Anesthetic complications: No anesthetic complications  PONV Status: none  Cardiovascular status: acceptable and stable  Respiratory status: acceptable and room air  Hydration status: acceptable  Post Neuraxial Block status: Motor and sensory function returned to baseline and No signs or symptoms of PDPH

## 2022-08-02 NOTE — PROGRESS NOTES
BRITTNI Todd   PROGRESS NOTE    Post-Op Day 1 S/P     Subjective:  Patient has no complaints  Pain controlled  Tolerating a regular diet  Not passing flatus  Ambulating  Urinating spontaneously  Lochia decreasing, no bleeding concerns  No lightheadedness or dizziness    Objective    Objective     Temp:  [97.5 °F (36.4 °C)-98.4 °F (36.9 °C)] 98.1 °F (36.7 °C)  Heart Rate:  [64-97] 81  Resp:  [18] 18  BP: ()/(44-70) 127/70     Physical Exam:  GEN: alert and in no distress  Abdomen: fundus firm - below the umbilicus  abdomen soft + BS's  The abdomen is appropriately tender to palpation around the incision  Incision: dressed, and the bandage is clean and dry  Extremi1+ edema, no redness or tenderness in the calves or thighsties:     Labs:  Lab Results (last 24 hours)     Procedure Component Value Units Date/Time    Blood Gas, Arterial, Cord [[id number]]  (Abnormal) Collected: 22    Specimen: Cord Blood Arterial from Umbilical Cord Updated: 22 0826     pH, Cord Arterial 7.32 pH Units      pCO2, Cord Arterial 53.0 mmHg      Base Exc, Cord Arterial -0.5 mmol/L      pO2, Cord Arterial <40.5 mmHg      HCO3, Cord Arterial 26.6 mmol/L     Blood Gas, Venous, Cord [[id number]]  (Abnormal) Collected: 22    Specimen: Cord Blood Venous from Umbilical Cord Updated: 22 0823     pH, Cord Venous 7.317 pH Units      pCO2, Cord Venous 53.3 mm Hg      pO2, Cord Venous <40.5 mm Hg      Base Excess, Cord Venous -0.4 mmol/L      HCO3, Cord Venous 26.7 mmol/L     CBC & Differential [[id number]]  (Abnormal) Collected: 22    Specimen: Blood Updated: 22    Narrative:      The following orders were created for panel order CBC & Differential.  Procedure                               Abnormality         Status                     ---------                               -----------         ------                     CBC Auto Differential[[id number]]        Abnormal             Final result               Scan Slide[[id number]]                                                                    Please view results for these tests on the individual orders.    CBC Auto Differential [[id number]]  (Abnormal) Collected: 22    Specimen: Blood Updated: 22     WBC 13.32 10*3/mm3      RBC 3.59 10*6/mm3      Hemoglobin 10.9 g/dL      Hematocrit 33.4 %      MCV 93.0 fL      MCH 30.4 pg      MCHC 32.6 g/dL      RDW 14.6 %      RDW-SD 50.2 fl      MPV 10.0 fL      Platelets 286 10*3/mm3      Neutrophil % 75.6 %      Lymphocyte % 16.6 %      Monocyte % 5.9 %      Eosinophil % 0.4 %      Basophil % 0.3 %      Immature Grans % 1.2 %      Neutrophils, Absolute 10.07 10*3/mm3      Lymphocytes, Absolute 2.21 10*3/mm3      Monocytes, Absolute 0.79 10*3/mm3      Eosinophils, Absolute 0.05 10*3/mm3      Basophils, Absolute 0.04 10*3/mm3      Immature Grans, Absolute 0.16 10*3/mm3      nRBC 0.0 /100 WBC              Assessment & Plan         delivery delivered    Previous  delivery affecting pregnancy    Obesity in pregnancy, antepartum    Rh negative, antepartum    Assessment & Plan    Assessment:    Deann Chou is Day 1  post-partum  , Low Transverse   .      Plan:    Routine postpartum/postop care    Ambulate, Remove IV, Shower, PO pain meds, Importance of wound care/keep clean and dry, Breast feeding support        Mahamed Chowdhury MD  22  07:16 EDT

## 2022-08-02 NOTE — PLAN OF CARE
Problem: Adult Inpatient Plan of Care  Goal: Plan of Care Review  Outcome: Ongoing, Progressing  Goal: Patient-Specific Goal (Individualized)  Outcome: Ongoing, Progressing  Goal: Absence of Hospital-Acquired Illness or Injury  Outcome: Ongoing, Progressing  Intervention: Identify and Manage Fall Risk  Recent Flowsheet Documentation  Taken 8/2/2022 1600 by Leta Piper RN  Safety Promotion/Fall Prevention: safety round/check completed  Taken 8/2/2022 1530 by Leta Piper RN  Safety Promotion/Fall Prevention: safety round/check completed  Taken 8/2/2022 1500 by Leta Piper RN  Safety Promotion/Fall Prevention: safety round/check completed  Taken 8/2/2022 1300 by Leta Piper RN  Safety Promotion/Fall Prevention: safety round/check completed  Taken 8/2/2022 1225 by Leta Piper RN  Safety Promotion/Fall Prevention: safety round/check completed  Taken 8/2/2022 1100 by Leta Piper RN  Safety Promotion/Fall Prevention: safety round/check completed  Taken 8/2/2022 1034 by Leta Piper RN  Safety Promotion/Fall Prevention: safety round/check completed  Taken 8/2/2022 0900 by Leta Piper RN  Safety Promotion/Fall Prevention: safety round/check completed  Taken 8/2/2022 0825 by Leta Piper RN  Safety Promotion/Fall Prevention: safety round/check completed  Intervention: Prevent Skin Injury  Recent Flowsheet Documentation  Taken 8/2/2022 1530 by Leta Piper RN  Body Position: position changed independently  Taken 8/2/2022 1034 by Leta Piper RN  Body Position: position changed independently  Intervention: Prevent and Manage VTE (Venous Thromboembolism) Risk  Recent Flowsheet Documentation  Taken 8/2/2022 1034 by Leta Piper RN  Activity Management: up ad alecia  Intervention: Prevent Infection  Recent Flowsheet Documentation  Taken 8/2/2022 1034 by Leta Piper RN  Infection Prevention:   hand hygiene promoted   personal protective equipment utilized   rest/sleep promoted   visitors  restricted/screened  Goal: Optimal Comfort and Wellbeing  Outcome: Ongoing, Progressing  Intervention: Monitor Pain and Promote Comfort  Recent Flowsheet Documentation  Taken 2022 1034 by Leta Piper RN  Pain Management Interventions:   care clustered   pain management plan reviewed with patient/caregiver   pillow support provided   quiet environment facilitated   see MAR  Intervention: Provide Person-Centered Care  Recent Flowsheet Documentation  Taken 2022 1034 by Leta Piper RN  Trust Relationship/Rapport:   care explained   choices provided   emotional support provided   empathic listening provided   questions answered   questions encouraged   thoughts/feelings acknowledged  Goal: Readiness for Transition of Care  Outcome: Ongoing, Progressing     Problem: Bleeding ( Delivery)  Goal: Bleeding is Controlled  Outcome: Ongoing, Progressing     Problem: Change in Fetal Wellbeing ( Delivery)  Goal: Stable Fetal Wellbeing  Outcome: Ongoing, Progressing  Intervention: Promote and Monitor Fetal Wellbeing  Recent Flowsheet Documentation  Taken 2022 1530 by Leta Piper RN  Body Position: position changed independently  Taken 2022 1034 by Leta Piper RN  Body Position: position changed independently     Problem: Infection ( Delivery)  Goal: Absence of Infection Signs and Symptoms  Outcome: Ongoing, Progressing  Intervention: Minimize Infection Risk  Recent Flowsheet Documentation  Taken 2022 1034 by Leta Piper RN  Infection Prevention:   hand hygiene promoted   personal protective equipment utilized   rest/sleep promoted   visitors restricted/screened     Problem: Respiratory Compromise ( Delivery)  Goal: Effective Oxygenation and Ventilation  Outcome: Ongoing, Progressing     Problem: Skin Injury Risk Increased  Goal: Skin Health and Integrity  Outcome: Ongoing, Progressing  Intervention: Optimize Skin Protection  Recent Flowsheet Documentation  Taken  8/2/2022 1034 by Leta Piper, RN  Head of Bed (HOB) Positioning: HOB elevated   Goal Outcome Evaluation:  Patient is progressing towards goals and pain has been WDL.

## 2022-08-02 NOTE — LACTATION NOTE
LC in to assist with this breastfeeding session. Patient states baby is struggling with breastfeeding on her left breast. LC assisted with foot ball hold on the right side then assisted with the left breast and baby was more actively suckling with this side. Patient states she was not successful with breastfeeding her first. LC discussed with patient about  normal  breastfeeding behaviors and breastfeeding expectations for the next 2 days and to call as needed for lactation assistance . Patient showed good understanding.

## 2022-08-03 ENCOUNTER — PATIENT OUTREACH (OUTPATIENT)
Dept: LABOR AND DELIVERY | Facility: HOSPITAL | Age: 32
End: 2022-08-03

## 2022-08-03 VITALS
WEIGHT: 240 LBS | RESPIRATION RATE: 20 BRPM | DIASTOLIC BLOOD PRESSURE: 79 MMHG | HEART RATE: 94 BPM | TEMPERATURE: 98.06 F | BODY MASS INDEX: 47.12 KG/M2 | OXYGEN SATURATION: 99 % | SYSTOLIC BLOOD PRESSURE: 111 MMHG | HEIGHT: 60 IN

## 2022-08-03 RX ORDER — IBUPROFEN 600 MG/1
600 TABLET ORAL EVERY 6 HOURS PRN
Qty: 60 TABLET | Refills: 1 | Status: SHIPPED | OUTPATIENT
Start: 2022-08-03

## 2022-08-03 RX ORDER — DOCUSATE SODIUM 100 MG/1
100 CAPSULE, LIQUID FILLED ORAL 2 TIMES DAILY
Qty: 60 CAPSULE | Refills: 1 | Status: SHIPPED | OUTPATIENT
Start: 2022-08-03 | End: 2022-08-16

## 2022-08-03 RX ORDER — HYDROCODONE BITARTRATE AND ACETAMINOPHEN 5; 325 MG/1; MG/1
1-2 TABLET ORAL EVERY 6 HOURS PRN
Qty: 20 TABLET | Refills: 0 | Status: SHIPPED | OUTPATIENT
Start: 2022-08-03 | End: 2022-08-16

## 2022-08-03 RX ADMIN — ACETAMINOPHEN 650 MG: 325 TABLET ORAL at 00:24

## 2022-08-03 RX ADMIN — ACETAMINOPHEN 650 MG: 325 TABLET ORAL at 12:35

## 2022-08-03 RX ADMIN — ACETAMINOPHEN 650 MG: 325 TABLET ORAL at 06:07

## 2022-08-03 RX ADMIN — DOCUSATE SODIUM 100 MG: 100 CAPSULE, LIQUID FILLED ORAL at 09:20

## 2022-08-03 RX ADMIN — IBUPROFEN 600 MG: 600 TABLET ORAL at 04:25

## 2022-08-03 RX ADMIN — IBUPROFEN 600 MG: 600 TABLET ORAL at 10:34

## 2022-08-03 NOTE — DISCHARGE SUMMARY
[facility]         DISCHARGE SUMMARY    Patient Name: Deann Chou  : 1990  MRN: [id number]    Date of Admission: 2022  Date of Discharge:  8/3/2022   Primary Care Physician: Peter Hawkins MD    Consults     No orders found from 7/3/2022 to 2022.           Procedures:  Repeat low-transverse  delivery with 2 layer uterine closure    Presenting Problem:   Previous  delivery affecting pregnancy [O34.219]    Admitting Diagnosis:  31-year-old  2 para 1 at 39 weeks and 1 days gestation  Previous  delivery  Elevated BMI  Rh-    Discharge Diagnosis:  31-year-old  2 para 1 at 39 weeks and 1 days gestation  Previous  delivery  Elevated BMI  Rh-  Repeat low-transverse  delivery with 2 layer uterine closure    Delivery Summary     OB Surgeon:  Mahamed Chowdhury MD  Anesthesia: Spinal  Delivery Type:  LTCS  Perineum: OBPERINEUM: NA  Feeding method: Breast and Bottle    Infant: female  infant;    Weight: 3350 g (7 lb 6.2 oz)     APGARS: 8  @ 1 minute / 9  @ 5 minutes   Venous Blood Gas:   pH, Cord Venous   Date Value Ref Range Status   2022 7.317 7.310 - 7.370 pH Units Final      Arterial Blood Gas:   pH, Cord Arterial   Date Value Ref Range Status   2022 7.32 (H) 7.21 - 7.31 pH Units Final        Hospital Course     Hospital Course:  Deann Chou is a 31 y.o.  39w1d who presented on 2022 for a scheduled repeat  delivery at term.  For full details of that procedure please see the separate dictated operative narrative.  After initial recovery in the PACU the patient was transferred to the postpartum unit for further postpartum care.  She had an uncomplicated postpartum course.  By postoperative day 1 she was doing well.  She was afebrile and her vital signs are stable.  Her Castellano catheter been removed and she was able to void without difficulty.  Her urine output has been  adequate.  Her postoperative day 1 labs were appropriate.  Her fluids were saline locked.  By day of discharge on postoperative day 2 the patient continued to do well.  She remained afebrile stable vital signs.  She was tolerating a regular diet without nausea or vomiting.  Her pain was well controlled.  She was ambulating without difficulty.  She was voiding without difficulty.  Her exam was benign.  Her incision was clean, dry, intact and without evidence of any infection.  Her infant was doing well and she desired discharge home.    Day of Discharge     Vital Signs:  Temp:  [97.34 °F (36.3 °C)-98.3 °F (36.8 °C)] 98.3 °F (36.8 °C)  Heart Rate:  [78-99] 78  Resp:  [18-20] 20  BP: (104-126)/(56-69) 117/66    Pertinent  and/or Most Recent Results     LAB RESULTS:       Lab 08/02/22  0535 08/01/22  0644   WBC 13.32* 11.86*   HEMOGLOBIN 10.9* 12.6   HEMATOCRIT 33.4* 38.2   PLATELETS 286 323   NEUTROS ABS 10.07* 7.98*   IMMATURE GRANS (ABS) 0.16* 0.17*   LYMPHS ABS 2.21 2.69   MONOS ABS 0.79 0.88   EOS ABS 0.05 0.09   MCV 93.0 92.0                 Lab 08/01/22  0644   ABO TYPING A   RH TYPING Negative   ANTIBODY SCREEN Negative     URINALYSIS@  Microbiology Results (last 10 days)     Procedure Component Value - Date/Time    COVID PRE-OP / PRE-PROCEDURE SCREENING ORDER (NO ISOLATION) - Swab, Nasopharynx [[id number]]  (Normal) Collected: 07/29/22 1209    Lab Status: Final result Specimen: Swab from Nasopharynx Updated: 07/30/22 0125    Narrative:      The following orders were created for panel order COVID PRE-OP / PRE-PROCEDURE SCREENING ORDER (NO ISOLATION) - Swab, Nasopharynx.  Procedure                               Abnormality         Status                     ---------                               -----------         ------                     COVID-19,APTIMA PANTHER(...[[id number]]  Normal              Final result                 Please view results for these tests on the individual orders.    COVID-19,APTIMA  DARNELLHER(VIOLETTA), HIMANSHU/ [facility], NP/OP SWAB IN UTM/VTM/SALINE TRANSPORT MEDIA,24 HR TAT - Swab, Nasopharynx [[id number]]  (Normal) Collected: 07/29/22 1209    Lab Status: Final result Specimen: Swab from Nasopharynx Updated: 07/30/22 0125     COVID19 Not Detected    Narrative:      Fact sheet for providers: https://www.fda.gov/media/292904/download     Fact sheet for patients: https://www.fda.gov/media/936114/download    Test performed by RT PCR.             Discharge Details        Discharge Medications      New Medications      Instructions Start Date   docusate sodium 100 MG capsule  Commonly known as: Colace   100 mg, Oral, 2 Times Daily      HYDROcodone-acetaminophen 5-325 MG per tablet  Commonly known as: Norco   1-2 tablets, Oral, Every 6 Hours PRN      ibuprofen 600 MG tablet  Commonly known as: ADVIL,MOTRIN   600 mg, Oral, Every 6 Hours PRN         Continue These Medications      Instructions Start Date   prenatal (CLASSIC) vitamin  tablet  Generic drug: prenatal vitamin   1 tablet, Oral, Daily      Prenatal Vitamin/Min +DHA 27-0.8-200 MG capsule   1 tablet, Oral, Daily             No Known Allergies    Discharge Disposition:   Home, self-care    Discharge Condition:  Good    Diet:   Regular    Discharge Activity:   Pelvic rest for 6 weeks, no intercourse for 6 weeks, no tampons or douching for 6 weeks, nothing in the vagina for 6 weeks  No driving for 2 weeks  No lifting more than 15 to 20 pounds for 2 weeks  No tub baths for 2 weeks, but may shower  Keep the incision clean and dry    Call the office or go to the emergency department for temperature greater than 100 °F, shortness of breath or chest pain, excessive nausea or vomiting, pain that is worsening despite current pain medications, redness, swelling, or drainage from the incision site, vaginal bleeding soaking a pad in less than 1 hour.    Follow Up:  Future Appointments   Date Time Provider Department Center   8/31/2022  3:00 PM Mahamed Chowdhury,  MD [facility]       Electronically signed by Mahamed Chowdhury MD, 08/03/22, 7:36 AM EDT.

## 2022-08-03 NOTE — DISCHARGE INSTRUCTIONS
Pelvic rest for 6 weeks, no intercourse for 6 weeks, no tampons or douching for 6 weeks, nothing in the vagina for 6 weeks  No driving for 2 weeks  No lifting more than 15 to 20 pounds for 2 weeks  No tub baths for 2 weeks, but may shower  Keep the incision clean and dry    Call the office or go to the emergency department for temperature greater than 100 °F, shortness of breath or chest pain, excessive nausea or vomiting, pain that is worsening despite current pain medications, redness, swelling, or drainage from the incision site, vaginal bleeding soaking a pad in less than 1 hour.

## 2022-08-03 NOTE — OUTREACH NOTE
[facility]  IP Postpartum    Questions/Answers    Flowsheet Row Responses   Best Method for Contacting Cell   Support Person Present Yes   Does the patient have a car seat at the hospital Yes   Delivery Note Reviewed Reviewed   Were birth expectations met? Yes   Is there a need for additional support/resources? No   Lactation Note Reviewed Reviewed   Is additional support needed? Yes   Yes, additional support needed comment difficulty latching, LC aware   Any questions or concerns? No   Is the patient going to use Meds to Beds? No   Any concerns related discharge meds/ability to  prescriptions? No   OB Discharge Navigator Reviewed  Reviewed   Confirm Postpartum OB appointment Yes   Postpartum OB appointment date 22   Confirm initial well-child Pediatrician appointment date/time: Yes   Initial Well Child Pediatrician Appointment Date 22   Additional post-discharge F/U appointments No   Does patient have transportation to appointments? Yes   Any other assistance needed to ensure she is able to attend appointments? No   Does patient have supplies needed at home for  care? Breast Pump, Clothing, Crib, Diapers, Formula  [pump is shipped, should be at house tomorrow]          Doing well. No questions or concerns.     Irene Myles RN  Maternity Nurse Navigator    8/3/2022, 10:38 EDT

## 2022-08-03 NOTE — PROGRESS NOTES
[facility]   PROGRESS NOTE    Post-Op Day 2 S/P     Subjective:  Patient has no complaints  Pain controlled  Tolerating a regular diet  Passing flatus  Ambulating  Urinating spontaneously  Lochia decreasing, no bleeding concerns  Denies HA, vision change, or RUQ/epigastric pain  No lightheadedness or dizziness  Desires DC home if baby Dc'd    Objective    Objective     Temp:  [97.34 °F (36.3 °C)-98.3 °F (36.8 °C)] 98.3 °F (36.8 °C)  Heart Rate:  [78-99] 78  Resp:  [18-20] 20  BP: (104-126)/(56-69) 117/66     Physical Exam:  GEN: alert and in no distress  Abdomen: fundus firm - below the umbilicus  abdomen soft + BS's  The abdomen is appropriately tender to palpation around the incision  Incision: clean, dry, and intact, healing well, no drainage, no erythema, no swelling, well approximated  Extremi1+ edema, no redness or tenderness in the calves or thighsties:     Labs:  Lab Results (last 24 hours)     ** No results found for the last 24 hours. **             Assessment & Plan         delivery delivered    Previous  delivery affecting pregnancy    Obesity in pregnancy, antepartum    Rh negative, antepartum    Assessment & Plan    Assessment:    Deann Chou is Day 2  post-partum  , Low Transverse   .      Plan:    Routine postpartum/postop care    Ambulate, Shower, PO pain meds, Importance of wound care/keep clean and dry, Breast feeding support, Discharge home, DC meds reviewed, Follow up scheduled, PP/PO precautions given, HTN precautions reviewed in detail.  Questions answered.  RTO/ER for HA not relieved w tylenol, vision changes, epig/RUQ pain, or Bps elevated at home.      Mahamed Chowdhury MD  22  07:17 EDT

## 2022-08-03 NOTE — LACTATION NOTE
LC in to follow up with breastfeeding progress. Patient states that football position is working very well for breastfeeding and she supplementing with formula after every breastfeeding session. LC encouraged pumping after every breastfeeding session to add stimulation to breasts for better milk supply because she is supplementing after each feeding. Patient is planning on discharge today. LC discussed normal infant output patterns to expect and unlimited time/access to the breast but if infant is not waking by 3 hours to wake and feed using measures shown in the hospital. LC discussed checking to make sure new medications are safe to breastfeed. LC discussed alcohol use and cigarette/second hand smoke around baby and breastfeeding and discussed the impact of street drugs on infants and breastfeeding. LC used the page in the breastfeeding guide to discuss harmful effects of these. Breastfeeding/Lactation expectations and anticipatory guidance discussed for the next two weeks . LC discussed nipple care, plugged ducts, engorgement, and breast infection. LC encouraged mom to see pediatrician two days from discharge for follow up. Patient has a breastpump for home use and LC discussed good pumping guidelines and normal expectations with pumping and storage and preparation of ebm for feedings. LC discussed breastfeeding/lactation resources after discharge and when to call the doctor. Patient showed good understanding.

## 2022-08-03 NOTE — PLAN OF CARE
Problem: Adult Inpatient Plan of Care  Goal: Plan of Care Review  Outcome: Ongoing, Progressing  Goal: Patient-Specific Goal (Individualized)  Outcome: Ongoing, Progressing  Goal: Absence of Hospital-Acquired Illness or Injury  Outcome: Ongoing, Progressing  Intervention: Identify and Manage Fall Risk  Recent Flowsheet Documentation  Taken 8/3/2022 0608 by Astrid Teresa RN  Safety Promotion/Fall Prevention: safety round/check completed  Taken 8/3/2022 0024 by Astrid Teresa RN  Safety Promotion/Fall Prevention: safety round/check completed  Taken 2022 by Astrid Teresa RN  Safety Promotion/Fall Prevention: safety round/check completed  Taken 2022 by Astrid Teresa RN  Safety Promotion/Fall Prevention: safety round/check completed  Intervention: Prevent Skin Injury  Recent Flowsheet Documentation  Taken 2022 by Astrid Teresa RN  Body Position: position changed independently  Intervention: Prevent and Manage VTE (Venous Thromboembolism) Risk  Recent Flowsheet Documentation  Taken 2022 by Astrid Teresa RN  Activity Management: up ad alecia  Goal: Optimal Comfort and Wellbeing  Outcome: Ongoing, Progressing  Intervention: Monitor Pain and Promote Comfort  Recent Flowsheet Documentation  Taken 8/3/2022 0608 by Astrid Teresa RN  Pain Management Interventions: see MAR  Taken 8/3/2022 0024 by Astrid Teresa RN  Pain Management Interventions: see MAR  Taken 2022 by Astrid Teresa RN  Pain Management Interventions: see MAR  Taken 2022 by Astrid Teresa RN  Pain Management Interventions: pain management plan reviewed with patient/caregiver  Goal: Readiness for Transition of Care  Outcome: Ongoing, Progressing     Problem: Bleeding ( Delivery)  Goal: Bleeding is Controlled  Outcome: Ongoing, Progressing     Problem: Change in Fetal Wellbeing ( Delivery)  Goal: Stable Fetal Wellbeing  Outcome: Ongoing, Progressing  Intervention: Promote  and Monitor Fetal Wellbeing  Recent Flowsheet Documentation  Taken 2022 by Astrid Teresa RN  Body Position: position changed independently     Problem: Infection ( Delivery)  Goal: Absence of Infection Signs and Symptoms  Outcome: Ongoing, Progressing     Problem: Respiratory Compromise ( Delivery)  Goal: Effective Oxygenation and Ventilation  Outcome: Ongoing, Progressing     Problem: Skin Injury Risk Increased  Goal: Skin Health and Integrity  Outcome: Ongoing, Progressing  Intervention: Optimize Skin Protection  Recent Flowsheet Documentation  Taken 2022 by Astrid Teresa RN  Head of Bed (HOB) Positioning: HOB elevated     Problem: Breastfeeding  Goal: Effective Breastfeeding  Outcome: Ongoing, Progressing   Goal Outcome Evaluation:         Patient resting.  Patient voiding without difficulty.  Vitals, assessment, and lochia WNL.  Patient's pain is being controlled with scheduled medication.  Patient breastfeeding and caring for infant in room.

## 2022-08-08 ENCOUNTER — TELEPHONE (OUTPATIENT)
Dept: LACTATION | Facility: HOSPITAL | Age: 32
End: 2022-08-08

## 2022-08-08 ENCOUNTER — TELEPHONE (OUTPATIENT)
Dept: OBSTETRICS AND GYNECOLOGY | Facility: CLINIC | Age: 32
End: 2022-08-08

## 2022-08-08 ENCOUNTER — OFFICE VISIT (OUTPATIENT)
Dept: OBSTETRICS AND GYNECOLOGY | Facility: CLINIC | Age: 32
End: 2022-08-08

## 2022-08-08 VITALS
WEIGHT: 236 LBS | BODY MASS INDEX: 46.09 KG/M2 | DIASTOLIC BLOOD PRESSURE: 85 MMHG | HEART RATE: 88 BPM | SYSTOLIC BLOOD PRESSURE: 129 MMHG | TEMPERATURE: 99.6 F

## 2022-08-08 DIAGNOSIS — L23.89 ALLERGIC CONTACT DERMATITIS DUE TO OTHER AGENTS: ICD-10-CM

## 2022-08-08 PROCEDURE — 99214 OFFICE O/P EST MOD 30 MIN: CPT | Performed by: OBSTETRICS & GYNECOLOGY

## 2022-08-08 RX ORDER — FLUCONAZOLE 150 MG/1
150 TABLET ORAL ONCE
Qty: 1 TABLET | Refills: 3 | Status: SHIPPED | OUTPATIENT
Start: 2022-08-08 | End: 2022-08-08

## 2022-08-08 RX ORDER — METHYLPREDNISOLONE 4 MG/1
TABLET ORAL
Qty: 21 TABLET | Refills: 0 | Status: SHIPPED | OUTPATIENT
Start: 2022-08-08 | End: 2022-08-16

## 2022-08-08 RX ORDER — ESCITALOPRAM OXALATE 10 MG/1
10 TABLET ORAL DAILY
Qty: 30 TABLET | Refills: 2 | Status: SHIPPED | OUTPATIENT
Start: 2022-08-08 | End: 2022-08-16

## 2022-08-08 NOTE — TELEPHONE ENCOUNTER
CARLOS called to check with this patient and her breastfeeding progress. Patient states she is now exclusively pumping but it is going well and has no concerns. She is meeting baby's demand and baby is now only getting breastmilk. She is pumping 2-4 oz / pumping session. She states she has the lactation dept number to call if she has any needs.

## 2022-08-08 NOTE — TELEPHONE ENCOUNTER
Called patient to advise her she needs to be seen to have her incision checked. She was going to discuss it with her  and call back.

## 2022-08-08 NOTE — ASSESSMENT & PLAN NOTE
Patient is 1 week postop from a   She states for the past 72 hours she has noticed a rash on the lower abdomen that is now extending onto her left groin and anterior thigh  The rash is pruritic in nature  She has been using washcloths to keep the incision and skin folds dry since surgery  Incision is clean dry and intact and unaffected on exam  There is a significant area of confluent erythematous rash with possible satellite lesions involving the lower abdomen above the  incision and extending around the left lateral aspect of the incision down to the left anterior thigh  If no improvement in 48 hours patient will come back in for repeat exam  Rashes not consistent with polyps.  Explained at this point I am uncertain the exact precise etiology.  The rash began approximately 4 days after delivery

## 2022-08-08 NOTE — PROGRESS NOTES
GYN Visit    CC: Postpartum depression and rash    HPI:   31 y.o. Contraception or HRT: Contraception: Abstinence, recently postpartum/delivered    Pt has concerns she would like to discuss.          History: PMHx, Meds, Allergies, PSHx, Social Hx, and POBHx all reviewed and updated.  Physical Exam  Abdominal:            PHYSICAL EXAM:  /85   Pulse 88   Temp 99.6 °F (37.6 °C)   Wt 107 kg (236 lb)   LMP 10/31/2021   Breastfeeding Yes   BMI 46.09 kg/m²   General- NAD, alert and oriented, appropriate  Psych- Normal mood, good memory    Abdomen- Soft, non distended, non tender, no masses, incision is clean dry and intact.  There is no involvement of the rash in the incision.  There is a confluent, erythematous, extensive rash on the underside of the pannus superior to the incision.  There are some small satellite lesions.  The rash extends around the left lateral aspect of the  incision and extends onto the anterior left thigh      ASSESSMENT AND PLAN:  Diagnoses and all orders for this visit:    1. Post partum depression (Primary)  Assessment & Plan:  Patient's depression is single episode and is moderate without psychosis. Their depression is currently active and the condition is newly identified. This will be reassessed in 2 weeks. F/U as described:patient was prescribed an antidepressant medicine.  Patient states she had some postpartum blues with her first delivery  She denies any suicidal or homicidal ideation  She feels overwhelmed and sometimes feels the need to walk away from frustrating situations  She has an excellent support system  Counseled regarding the risks benefits of medical management with Lexapro    Orders:  -     escitalopram (Lexapro) 10 MG tablet; Take 1 tablet by mouth Daily.  Dispense: 30 tablet; Refill: 2    2. Allergic contact dermatitis due to other agents  Assessment & Plan:  Patient is 1 week postop from a   She states for the past 72 hours she has  noticed a rash on the lower abdomen that is now extending onto her left groin and anterior thigh  The rash is pruritic in nature  She has been using washcloths to keep the incision and skin folds dry since surgery  Incision is clean dry and intact and unaffected on exam  There is a significant area of confluent erythematous rash with possible satellite lesions involving the lower abdomen above the  incision and extending around the left lateral aspect of the incision down to the left anterior thigh  If no improvement in 48 hours patient will come back in for repeat exam  Rashes not consistent with polyps.  Explained at this point I am uncertain the exact precise etiology.  The rash began approximately 4 days after delivery    Orders:  -     methylPREDNISolone (MEDROL) 4 MG dose pack; Take as directed on package instructions.  Dispense: 21 tablet; Refill: 0  -     fluconazole (Diflucan) 150 MG tablet; Take 1 tablet by mouth 1 (One) Time for 1 dose.  Dispense: 1 tablet; Refill: 3              Follow Up:  Return in about 2 weeks (around 2022) for dr. Chowdhury.          Isaura Kumar MD  2022

## 2022-08-08 NOTE — ASSESSMENT & PLAN NOTE
Patient's depression is single episode and is moderate without psychosis. Their depression is currently active and the condition is newly identified. This will be reassessed in 2 weeks. F/U as described:patient was prescribed an antidepressant medicine.  Patient states she had some postpartum blues with her first delivery  She denies any suicidal or homicidal ideation  She feels overwhelmed and sometimes feels the need to walk away from frustrating situations  She has an excellent support system  Counseled regarding the risks benefits of medical management with Lexapro

## 2022-08-08 NOTE — TELEPHONE ENCOUNTER
Patient has a  with Dr. Chowdhury .     1. She started noticing on Friday/Saturday the area around her incision is red like a sunburn and some spots are bumpy. It has a slight warmth with a burning/itchy feeling. She states the redness is spreading down towards her legs. Otherwise the incision looks good. She didn't know if this was something to expect.     2. She also complains of some postpartum depression. She states she feels overwhelmed. She denies SI/HI. She wanted to know if she could get a script to help. She has a postpartum visit scheduled .    Please advise.

## 2022-08-10 ENCOUNTER — PATIENT OUTREACH (OUTPATIENT)
Dept: LABOR AND DELIVERY | Facility: HOSPITAL | Age: 32
End: 2022-08-10

## 2022-08-10 NOTE — OUTREACH NOTE
[facility]  Postpartum Check-In    Questions/Answers    Flowsheet Row Responses   Visit Setting Telephone   Best Method for Contacting Cell   OB Discharge Note Reviewed  Reviewed   OB Discharge Navigator Reviewed  Reviewed   OB Discharge Medications Reviewed  Reviewed   [place] discharged home with mother? Yes   Current Pain Levels 0-10 0   At Rest Pain Levels 0-10 0   Pain level with activity 0-10 0   Acceptable Pain Level 0-10 0   Verbalized Emotional State Acceptance   Family/Support Network Family, Father   Level of Involvement in Care Attentive, Interactive, Supportive   Is patient breastfeeding? Yes, pumping   pumping - Left Breast Nontender   Pumping - Right Breast Nontender   Pumping - Left Nipple Intact, Nontender   Pumping - Right Nipple Intact, Nontender   Frequency Q3-4H   Pumping Quantity 2-4oz   Followup Appointments Made Yes   Well Child Visit Appointments Made Yes   Appointment Date 22   Well Child Check Up Date: 22   Did you complete the visit? Yes   Were there any specific concerns? No   Umbilical Cord No reported signs or symptoms   Feeding Readiness Cues: Eager, Crying, Energy for feeding   Infant Feeding Method Formula, Expressed Breast Milk   Feeding Tolerance Adequate pause for breath, Alert for feeding   Is a lactation referral indicated? No   Formula Type --  [Similac Sensitive]   Formula PO (mL) 2-3oz   Formula/Expressed Milk frequency of feedings: once a day   Expressed milk PO (mL) 2-4oz   Expressed milk- frequency of feedings q3-4h   Number of wet diapers x 24 hours 8   Last BM x 24 hours 8   Emesis (Unmeasured Occurence) 0   What safe sleep surface is available? Bassinet   Are there stuffed animals, toys, pillows, quilts, blankets, wedges, positioners, bumpers or other loose bedding in the infant's sleeping environment? No   Where does the baby usually sleep? Bassinet   Does the baby ever share a sleep surface with a sibling, adult or pet? No   Does the baby ever  share a sleep surface in a bed, couch, recliner or other? No   What position do you place your baby to sleep for naps? Back   What position do you place your baby to sleep at night Back   Are you and/or other caregivers smoking inside or outside the baby's home? No   Is the infant dressed appropiately for the temperature of the home? Yes   Do you use a clean, dry pacifier that is not attached to a string or stuffed animal? No          Review of Systems    [place]  Depression Score: 2 (2022  8:00 PM)      Doing well. Information given on contacting [facility]. No other questions or concerns.     Irene Myles RN  Maternity Nurse Navigator    8/10/2022, 13:44 EDT

## 2022-08-16 ENCOUNTER — POSTPARTUM VISIT (OUTPATIENT)
Dept: OBSTETRICS AND GYNECOLOGY | Facility: CLINIC | Age: 32
End: 2022-08-16

## 2022-08-16 VITALS
HEIGHT: 60 IN | BODY MASS INDEX: 43.98 KG/M2 | WEIGHT: 224 LBS | SYSTOLIC BLOOD PRESSURE: 118 MMHG | HEART RATE: 79 BPM | DIASTOLIC BLOOD PRESSURE: 80 MMHG

## 2022-08-16 DIAGNOSIS — R21 RASH: Primary | ICD-10-CM

## 2022-08-16 DIAGNOSIS — L23.89 ALLERGIC CONTACT DERMATITIS DUE TO OTHER AGENTS: ICD-10-CM

## 2022-08-16 PROBLEM — O99.210 OBESITY IN PREGNANCY, ANTEPARTUM: Status: RESOLVED | Noted: 2022-02-10 | Resolved: 2022-08-16

## 2022-08-16 PROBLEM — O34.219 PREVIOUS CESAREAN DELIVERY AFFECTING PREGNANCY: Status: RESOLVED | Noted: 2022-02-08 | Resolved: 2022-08-16

## 2022-08-16 PROCEDURE — 99214 OFFICE O/P EST MOD 30 MIN: CPT | Performed by: OBSTETRICS & GYNECOLOGY

## 2022-08-16 RX ORDER — TRIAMCINOLONE ACETONIDE 5 MG/G
1 CREAM TOPICAL 2 TIMES DAILY
Qty: 15 G | Refills: 1 | Status: SHIPPED | OUTPATIENT
Start: 2022-08-16

## 2022-08-16 NOTE — ASSESSMENT & PLAN NOTE
Suspect the patient has contact dermatitis due to the adhesive from the drape and bandage.  We will try triamcinolone 0.5% cream applied twice daily for the next 7 days.  Also recommended Benadryl intermittently for severe itching only.  Discussed that repetitive Benadryl use could result in decreased milk supply

## 2022-08-16 NOTE — PROGRESS NOTES
"Post Delivery Follow up (1-4weeks)      CC: Incision check    Delivery type:   LTCS  Perineum: NA  Feeding: Breast    HPI:        HA:  No  Vision changes:  No  RUQ/epigastric pain:  No  Swelling:  No  Pain:  No  Vaginal Bleeding:  Yes  Depressed/Anxious:  No    Not really concerned with the incision itself, however there is a rash around the incision also on the low back where the spinal was placed and on the leg.  More on the right than the left.       /80   Pulse 79   Ht 152.4 cm (60\")   Wt 102 kg (224 lb)   LMP 10/31/2021   Breastfeeding Yes   BMI 43.75 kg/m²     Physical Exam  Vitals and nursing note reviewed. Exam conducted with a chaperone present.   Constitutional:       General: She is not in acute distress.     Appearance: Normal appearance. She is obese. She is not ill-appearing.   Abdominal:      General: Abdomen is flat. There is no distension.      Palpations: Abdomen is soft.      Tenderness: There is no abdominal tenderness. There is no guarding or rebound.      Hernia: No hernia is present.      Comments: The incision is clean, dry, intact and healing well.  There is no signs of infection.  There is an erythematous rash surrounding the entire incision and lower abdomen consistent with the distribution of where the patient's bandage was.   Musculoskeletal:         General: Swelling present. No tenderness.      Comments: There is a similar rash to the abdomen on the right lower extremity in the area where the Castellano catheter anchor would have been.   Skin:     Findings: Rash present.      Comments: There is also a similar erythematous rash on the low back in the area of the distribution of the drape from the spinal placement   Neurological:      Mental Status: She is alert and oriented to person, place, and time.   Psychiatric:         Mood and Affect: Mood normal.         Behavior: Behavior normal.         Thought Content: Thought content normal.         Judgment: Judgment normal. "         ASSESSMENT AND PLAN:  Diagnoses and all orders for this visit:    1. Rash (Primary)  -     triamcinolone (KENALOG) 0.5 % cream; Apply 1 application topically to the appropriate area as directed 2 (Two) Times a Day.  Dispense: 15 g; Refill: 1    2. Postpartum follow-up    3.  delivery delivered    4. Allergic contact dermatitis due to other agents  Assessment & Plan:  Suspect the patient has contact dermatitis due to the adhesive from the drape and bandage.  We will try triamcinolone 0.5% cream applied twice daily for the next 7 days.  Also recommended Benadryl intermittently for severe itching only.  Discussed that repetitive Benadryl use could result in decreased milk supply          Follow Up:  Return for Next scheduled follow up.    Mahamed Chowdhury MD  2022

## 2022-08-31 ENCOUNTER — POSTPARTUM VISIT (OUTPATIENT)
Dept: OBSTETRICS AND GYNECOLOGY | Facility: CLINIC | Age: 32
End: 2022-08-31

## 2022-08-31 VITALS
DIASTOLIC BLOOD PRESSURE: 89 MMHG | SYSTOLIC BLOOD PRESSURE: 132 MMHG | BODY MASS INDEX: 44.68 KG/M2 | HEART RATE: 96 BPM | WEIGHT: 227.6 LBS | HEIGHT: 60 IN

## 2022-08-31 DIAGNOSIS — Z30.09 BIRTH CONTROL COUNSELING: ICD-10-CM

## 2022-08-31 PROCEDURE — 0503F POSTPARTUM CARE VISIT: CPT | Performed by: OBSTETRICS & GYNECOLOGY

## 2022-08-31 RX ORDER — ACETAMINOPHEN AND CODEINE PHOSPHATE 120; 12 MG/5ML; MG/5ML
1 SOLUTION ORAL DAILY
Qty: 28 TABLET | Refills: 12 | Status: SHIPPED | OUTPATIENT
Start: 2022-08-31 | End: 2023-08-31

## 2022-09-06 PROBLEM — Z34.80 SUPERVISION OF OTHER NORMAL PREGNANCY, ANTEPARTUM: Status: RESOLVED | Noted: 2022-02-08 | Resolved: 2022-09-06

## 2022-09-06 PROBLEM — O26.899 RH NEGATIVE, ANTEPARTUM: Status: RESOLVED | Noted: 2022-02-10 | Resolved: 2022-09-06

## 2022-09-06 PROBLEM — Z67.91 RH NEGATIVE, ANTEPARTUM: Status: RESOLVED | Noted: 2022-02-10 | Resolved: 2022-09-06

## 2022-09-06 NOTE — ASSESSMENT & PLAN NOTE
Stable postpartum course  May resume normal activities  No further postoperative restrictions  Continue prenatal vitamins
